# Patient Record
Sex: MALE | URBAN - METROPOLITAN AREA
[De-identification: names, ages, dates, MRNs, and addresses within clinical notes are randomized per-mention and may not be internally consistent; named-entity substitution may affect disease eponyms.]

---

## 2021-01-18 ENCOUNTER — HOSPITAL ENCOUNTER (INPATIENT)
Facility: MEDICAL CENTER | Age: 21
LOS: 2 days | DRG: 638 | End: 2021-01-20
Attending: INTERNAL MEDICINE | Admitting: INTERNAL MEDICINE
Payer: MEDICAID

## 2021-01-18 ENCOUNTER — APPOINTMENT (OUTPATIENT)
Dept: RADIOLOGY | Facility: MEDICAL CENTER | Age: 21
End: 2021-01-18
Payer: MEDICAID

## 2021-01-18 PROBLEM — E10.10 DIABETIC KETOACIDOSIS WITHOUT COMA ASSOCIATED WITH TYPE 1 DIABETES MELLITUS (HCC): Status: ACTIVE | Noted: 2021-01-18

## 2021-01-18 PROBLEM — F12.90 MARIJUANA USER: Status: ACTIVE | Noted: 2021-01-18

## 2021-01-18 PROBLEM — R65.10 SIRS (SYSTEMIC INFLAMMATORY RESPONSE SYNDROME) (HCC): Status: ACTIVE | Noted: 2021-01-18

## 2021-01-18 LAB
ALBUMIN SERPL BCP-MCNC: 4.3 G/DL (ref 3.2–4.9)
ALBUMIN/GLOB SERPL: 1.9 G/DL
ALP SERPL-CCNC: 78 U/L (ref 30–99)
ALT SERPL-CCNC: 9 U/L (ref 2–50)
ANION GAP SERPL CALC-SCNC: 17 MMOL/L (ref 7–16)
AST SERPL-CCNC: 10 U/L (ref 12–45)
B-OH-BUTYR SERPL-MCNC: 3.62 MMOL/L (ref 0.02–0.27)
BASOPHILS # BLD AUTO: 0.1 % (ref 0–1.8)
BASOPHILS # BLD: 0.03 K/UL (ref 0–0.12)
BILIRUB SERPL-MCNC: 0.6 MG/DL (ref 0.1–1.5)
BUN SERPL-MCNC: 12 MG/DL (ref 8–22)
CALCIUM SERPL-MCNC: 9.6 MG/DL (ref 8.5–10.5)
CHLORIDE SERPL-SCNC: 108 MMOL/L (ref 96–112)
CO2 SERPL-SCNC: 18 MMOL/L (ref 20–33)
CREAT SERPL-MCNC: 0.64 MG/DL (ref 0.5–1.4)
EOSINOPHIL # BLD AUTO: 0 K/UL (ref 0–0.51)
EOSINOPHIL NFR BLD: 0 % (ref 0–6.9)
ERYTHROCYTE [DISTWIDTH] IN BLOOD BY AUTOMATED COUNT: 41.4 FL (ref 35.9–50)
GLOBULIN SER CALC-MCNC: 2.3 G/DL (ref 1.9–3.5)
GLUCOSE BLD-MCNC: 183 MG/DL (ref 65–99)
GLUCOSE BLD-MCNC: 218 MG/DL (ref 65–99)
GLUCOSE BLD-MCNC: 227 MG/DL (ref 65–99)
GLUCOSE BLD-MCNC: 234 MG/DL (ref 65–99)
GLUCOSE BLD-MCNC: 240 MG/DL (ref 65–99)
GLUCOSE BLD-MCNC: 247 MG/DL (ref 65–99)
GLUCOSE BLD-MCNC: 260 MG/DL (ref 65–99)
GLUCOSE SERPL-MCNC: 263 MG/DL (ref 65–99)
HCT VFR BLD AUTO: 42.5 % (ref 42–52)
HGB BLD-MCNC: 14.5 G/DL (ref 14–18)
IMM GRANULOCYTES # BLD AUTO: 0.13 K/UL (ref 0–0.11)
IMM GRANULOCYTES NFR BLD AUTO: 0.6 % (ref 0–0.9)
LYMPHOCYTES # BLD AUTO: 1.87 K/UL (ref 1–4.8)
LYMPHOCYTES NFR BLD: 8.8 % (ref 22–41)
MAGNESIUM SERPL-MCNC: 1.6 MG/DL (ref 1.5–2.5)
MCH RBC QN AUTO: 29.2 PG (ref 27–33)
MCHC RBC AUTO-ENTMCNC: 34.1 G/DL (ref 33.7–35.3)
MCV RBC AUTO: 85.7 FL (ref 81.4–97.8)
MONOCYTES # BLD AUTO: 1.86 K/UL (ref 0–0.85)
MONOCYTES NFR BLD AUTO: 8.8 % (ref 0–13.4)
NEUTROPHILS # BLD AUTO: 17.24 K/UL (ref 1.82–7.42)
NEUTROPHILS NFR BLD: 81.7 % (ref 44–72)
NRBC # BLD AUTO: 0 K/UL
NRBC BLD-RTO: 0 /100 WBC
PHOSPHATE SERPL-MCNC: 1.7 MG/DL (ref 2.5–4.5)
PLATELET # BLD AUTO: 228 K/UL (ref 164–446)
PMV BLD AUTO: 11.5 FL (ref 9–12.9)
POTASSIUM SERPL-SCNC: 4 MMOL/L (ref 3.6–5.5)
PROT SERPL-MCNC: 6.6 G/DL (ref 6–8.2)
RBC # BLD AUTO: 4.96 M/UL (ref 4.7–6.1)
SODIUM SERPL-SCNC: 143 MMOL/L (ref 135–145)
WBC # BLD AUTO: 21.1 K/UL (ref 4.8–10.8)

## 2021-01-18 PROCEDURE — 83735 ASSAY OF MAGNESIUM: CPT

## 2021-01-18 PROCEDURE — 700111 HCHG RX REV CODE 636 W/ 250 OVERRIDE (IP): Performed by: HOSPITALIST

## 2021-01-18 PROCEDURE — 80053 COMPREHEN METABOLIC PANEL: CPT

## 2021-01-18 PROCEDURE — 99291 CRITICAL CARE FIRST HOUR: CPT | Performed by: INTERNAL MEDICINE

## 2021-01-18 PROCEDURE — 0240U HCHG SARS-COV-2 COVID-19 NFCT DS RESP RNA 3 TRGT MIC: CPT

## 2021-01-18 PROCEDURE — 700102 HCHG RX REV CODE 250 W/ 637 OVERRIDE(OP): Performed by: HOSPITALIST

## 2021-01-18 PROCEDURE — 82010 KETONE BODYS QUAN: CPT

## 2021-01-18 PROCEDURE — 85025 COMPLETE CBC W/AUTO DIFF WBC: CPT

## 2021-01-18 PROCEDURE — 82962 GLUCOSE BLOOD TEST: CPT

## 2021-01-18 PROCEDURE — 84100 ASSAY OF PHOSPHORUS: CPT

## 2021-01-18 PROCEDURE — 700105 HCHG RX REV CODE 258: Performed by: HOSPITALIST

## 2021-01-18 PROCEDURE — 770022 HCHG ROOM/CARE - ICU (200)

## 2021-01-18 PROCEDURE — 700111 HCHG RX REV CODE 636 W/ 250 OVERRIDE (IP): Performed by: INTERNAL MEDICINE

## 2021-01-18 PROCEDURE — 99223 1ST HOSP IP/OBS HIGH 75: CPT | Performed by: HOSPITALIST

## 2021-01-18 RX ORDER — ONDANSETRON 2 MG/ML
4 INJECTION INTRAMUSCULAR; INTRAVENOUS EVERY 4 HOURS PRN
Status: DISCONTINUED | OUTPATIENT
Start: 2021-01-18 | End: 2021-01-20 | Stop reason: HOSPADM

## 2021-01-18 RX ORDER — ONDANSETRON 4 MG/1
4 TABLET, ORALLY DISINTEGRATING ORAL EVERY 4 HOURS PRN
Status: DISCONTINUED | OUTPATIENT
Start: 2021-01-18 | End: 2021-01-20 | Stop reason: HOSPADM

## 2021-01-18 RX ORDER — DEXTROSE AND SODIUM CHLORIDE 10; .45 G/100ML; G/100ML
INJECTION, SOLUTION INTRAVENOUS CONTINUOUS
Status: ACTIVE | OUTPATIENT
Start: 2021-01-18 | End: 2021-01-19

## 2021-01-18 RX ORDER — POLYETHYLENE GLYCOL 3350 17 G/17G
1 POWDER, FOR SOLUTION ORAL
Status: DISCONTINUED | OUTPATIENT
Start: 2021-01-18 | End: 2021-01-20 | Stop reason: HOSPADM

## 2021-01-18 RX ORDER — POTASSIUM CHLORIDE 7.45 MG/ML
10 INJECTION INTRAVENOUS ONCE
Status: COMPLETED | OUTPATIENT
Start: 2021-01-18 | End: 2021-01-18

## 2021-01-18 RX ORDER — ONDANSETRON 2 MG/ML
4 INJECTION INTRAMUSCULAR; INTRAVENOUS EVERY 4 HOURS PRN
Status: ACTIVE | OUTPATIENT
Start: 2021-01-18 | End: 2021-01-18

## 2021-01-18 RX ORDER — DEXTROSE, SODIUM CHLORIDE, SODIUM LACTATE, POTASSIUM CHLORIDE, AND CALCIUM CHLORIDE 5; .6; .31; .03; .02 G/100ML; G/100ML; G/100ML; G/100ML; G/100ML
INJECTION, SOLUTION INTRAVENOUS CONTINUOUS
Status: DISCONTINUED | OUTPATIENT
Start: 2021-01-18 | End: 2021-01-19

## 2021-01-18 RX ORDER — BISACODYL 10 MG
10 SUPPOSITORY, RECTAL RECTAL
Status: DISCONTINUED | OUTPATIENT
Start: 2021-01-18 | End: 2021-01-20 | Stop reason: HOSPADM

## 2021-01-18 RX ORDER — PROMETHAZINE HYDROCHLORIDE 25 MG/1
12.5-25 SUPPOSITORY RECTAL EVERY 4 HOURS PRN
Status: DISCONTINUED | OUTPATIENT
Start: 2021-01-18 | End: 2021-01-20 | Stop reason: HOSPADM

## 2021-01-18 RX ORDER — ACETAMINOPHEN 325 MG/1
650 TABLET ORAL EVERY 6 HOURS PRN
Status: DISCONTINUED | OUTPATIENT
Start: 2021-01-18 | End: 2021-01-20 | Stop reason: HOSPADM

## 2021-01-18 RX ORDER — MAGNESIUM SULFATE HEPTAHYDRATE 40 MG/ML
4 INJECTION, SOLUTION INTRAVENOUS
Status: COMPLETED | OUTPATIENT
Start: 2021-01-18 | End: 2021-01-19

## 2021-01-18 RX ORDER — MAGNESIUM SULFATE HEPTAHYDRATE 40 MG/ML
2 INJECTION, SOLUTION INTRAVENOUS
Status: COMPLETED | OUTPATIENT
Start: 2021-01-18 | End: 2021-01-19

## 2021-01-18 RX ORDER — PROMETHAZINE HYDROCHLORIDE 25 MG/1
12.5-25 TABLET ORAL EVERY 4 HOURS PRN
Status: DISCONTINUED | OUTPATIENT
Start: 2021-01-18 | End: 2021-01-20 | Stop reason: HOSPADM

## 2021-01-18 RX ORDER — SODIUM CHLORIDE, SODIUM LACTATE, POTASSIUM CHLORIDE, AND CALCIUM CHLORIDE .6; .31; .03; .02 G/100ML; G/100ML; G/100ML; G/100ML
2000 INJECTION, SOLUTION INTRAVENOUS ONCE
Status: DISCONTINUED | OUTPATIENT
Start: 2021-01-18 | End: 2021-01-18

## 2021-01-18 RX ORDER — SODIUM CHLORIDE, SODIUM LACTATE, POTASSIUM CHLORIDE, CALCIUM CHLORIDE 600; 310; 30; 20 MG/100ML; MG/100ML; MG/100ML; MG/100ML
INJECTION, SOLUTION INTRAVENOUS CONTINUOUS
Status: DISCONTINUED | OUTPATIENT
Start: 2021-01-18 | End: 2021-01-19

## 2021-01-18 RX ORDER — AMOXICILLIN 250 MG
2 CAPSULE ORAL 2 TIMES DAILY
Status: DISCONTINUED | OUTPATIENT
Start: 2021-01-18 | End: 2021-01-20 | Stop reason: HOSPADM

## 2021-01-18 RX ORDER — PROCHLORPERAZINE EDISYLATE 5 MG/ML
5-10 INJECTION INTRAMUSCULAR; INTRAVENOUS EVERY 4 HOURS PRN
Status: DISCONTINUED | OUTPATIENT
Start: 2021-01-18 | End: 2021-01-20 | Stop reason: HOSPADM

## 2021-01-18 RX ORDER — SODIUM CHLORIDE 9 MG/ML
2000 INJECTION, SOLUTION INTRAVENOUS ONCE
Status: DISCONTINUED | OUTPATIENT
Start: 2021-01-18 | End: 2021-01-18

## 2021-01-18 RX ADMIN — POTASSIUM CHLORIDE 10 MEQ: 7.46 INJECTION, SOLUTION INTRAVENOUS at 22:31

## 2021-01-18 RX ADMIN — SODIUM CHLORIDE, SODIUM LACTATE, POTASSIUM CHLORIDE, CALCIUM CHLORIDE AND DEXTROSE MONOHYDRATE: 5; 600; 310; 30; 20 INJECTION, SOLUTION INTRAVENOUS at 17:26

## 2021-01-18 RX ADMIN — PROCHLORPERAZINE EDISYLATE 10 MG: 5 INJECTION INTRAMUSCULAR; INTRAVENOUS at 23:07

## 2021-01-18 RX ADMIN — SODIUM CHLORIDE 2 UNITS/HR: 9 INJECTION, SOLUTION INTRAVENOUS at 17:23

## 2021-01-18 RX ADMIN — SODIUM CHLORIDE, POTASSIUM CHLORIDE, SODIUM LACTATE AND CALCIUM CHLORIDE: 600; 310; 30; 20 INJECTION, SOLUTION INTRAVENOUS at 18:57

## 2021-01-18 RX ADMIN — MAGNESIUM SULFATE 2 G: 2 INJECTION INTRAVENOUS at 22:06

## 2021-01-18 RX ADMIN — ONDANSETRON 4 MG: 2 INJECTION INTRAMUSCULAR; INTRAVENOUS at 19:46

## 2021-01-18 SDOH — ECONOMIC STABILITY: FOOD INSECURITY: WITHIN THE PAST 12 MONTHS, THE FOOD YOU BOUGHT JUST DIDN'T LAST AND YOU DIDN'T HAVE MONEY TO GET MORE.: PATIENT DECLINED

## 2021-01-18 SDOH — HEALTH STABILITY: MENTAL HEALTH: HOW OFTEN DO YOU HAVE A DRINK CONTAINING ALCOHOL?: MONTHLY OR LESS

## 2021-01-18 SDOH — ECONOMIC STABILITY: TRANSPORTATION INSECURITY
IN THE PAST 12 MONTHS, HAS LACK OF TRANSPORTATION KEPT YOU FROM MEETINGS, WORK, OR FROM GETTING THINGS NEEDED FOR DAILY LIVING?: PATIENT DECLINED

## 2021-01-18 SDOH — ECONOMIC STABILITY: TRANSPORTATION INSECURITY
IN THE PAST 12 MONTHS, HAS THE LACK OF TRANSPORTATION KEPT YOU FROM MEDICAL APPOINTMENTS OR FROM GETTING MEDICATIONS?: PATIENT DECLINED

## 2021-01-18 SDOH — ECONOMIC STABILITY: FOOD INSECURITY: WITHIN THE PAST 12 MONTHS, YOU WORRIED THAT YOUR FOOD WOULD RUN OUT BEFORE YOU GOT MONEY TO BUY MORE.: PATIENT DECLINED

## 2021-01-18 ASSESSMENT — ENCOUNTER SYMPTOMS
SORE THROAT: 0
PALPITATIONS: 0
SPUTUM PRODUCTION: 0
HEADACHES: 0
COUGH: 0
FEVER: 0
ABDOMINAL PAIN: 1
HEMOPTYSIS: 0
VOMITING: 1
MYALGIAS: 0
CHILLS: 0
NAUSEA: 1
SHORTNESS OF BREATH: 0
DIARRHEA: 1
BLURRED VISION: 0

## 2021-01-18 ASSESSMENT — PAIN DESCRIPTION - PAIN TYPE: TYPE: ACUTE PAIN

## 2021-01-18 NOTE — CONSULTS
[facility] INTENSIVIST TRIAGE OFFICER DIRECT ADMISSION REPORT    Transferring facility: [facility] (Critical Access)  Transferring physician: Dr Trinidad, ED  Transferring facility/physician contact number: [place]    Chief complaint: DKA  Pertinent history & patient course: 22yo male with hx of DM, presented with N/V, BG 600s, diagnosed with DKA. Insulin gtt started  Pertinent imaging & lab results: -600s, pH 7.27, HCO3 13, AG 35, WBC 26K. Initially had tachycardia but resolved.     Code Status: not obtained.    Further work up or recommendations per triage officer prior to transfer: check COVID. They only have SARS CoV-2 Antigen rapid test, not PCR. I asked to go ahead and check for antigen.    Consultants called prior to transfer and pertinent input from consultants: Critical care  Patient accepted for transfer: Yes  Consultants to be called upon arrival: Triage Officer  Admission status: Inpatient.   Floor requested: ICU - will be COVID rule out. Need to swab for SARS CoV-2 PCR when pt arrived  If ICU transfer, name of intensivist case discussed with and pertinent input from critical care: Florin Carnes, DO    Please inform the triage officer upon arrival of the patient to [facility] for assignment of a hospitalist to perform admission.     For any question or concerns regarding the care of this patient, please reach out to the assigned hospitalist.

## 2021-01-19 PROBLEM — R11.2 NON-INTRACTABLE VOMITING WITH NAUSEA: Status: ACTIVE | Noted: 2021-01-19

## 2021-01-19 LAB
ANION GAP SERPL CALC-SCNC: 8 MMOL/L (ref 7–16)
ANION GAP SERPL CALC-SCNC: 9 MMOL/L (ref 7–16)
ANION GAP SERPL CALC-SCNC: 9 MMOL/L (ref 7–16)
BUN SERPL-MCNC: 10 MG/DL (ref 8–22)
BUN SERPL-MCNC: 5 MG/DL (ref 8–22)
BUN SERPL-MCNC: 6 MG/DL (ref 8–22)
CALCIUM SERPL-MCNC: 9.2 MG/DL (ref 8.5–10.5)
CALCIUM SERPL-MCNC: 9.3 MG/DL (ref 8.5–10.5)
CALCIUM SERPL-MCNC: 9.3 MG/DL (ref 8.5–10.5)
CHLORIDE SERPL-SCNC: 103 MMOL/L (ref 96–112)
CHLORIDE SERPL-SCNC: 104 MMOL/L (ref 96–112)
CHLORIDE SERPL-SCNC: 107 MMOL/L (ref 96–112)
CO2 SERPL-SCNC: 24 MMOL/L (ref 20–33)
CO2 SERPL-SCNC: 25 MMOL/L (ref 20–33)
CO2 SERPL-SCNC: 27 MMOL/L (ref 20–33)
CREAT SERPL-MCNC: 0.53 MG/DL (ref 0.5–1.4)
CREAT SERPL-MCNC: 0.54 MG/DL (ref 0.5–1.4)
CREAT SERPL-MCNC: 0.57 MG/DL (ref 0.5–1.4)
FLUAV RNA SPEC QL NAA+PROBE: NEGATIVE
FLUBV RNA SPEC QL NAA+PROBE: NEGATIVE
GLUCOSE BLD-MCNC: 107 MG/DL (ref 65–99)
GLUCOSE BLD-MCNC: 114 MG/DL (ref 65–99)
GLUCOSE BLD-MCNC: 120 MG/DL (ref 65–99)
GLUCOSE BLD-MCNC: 121 MG/DL (ref 65–99)
GLUCOSE BLD-MCNC: 143 MG/DL (ref 65–99)
GLUCOSE BLD-MCNC: 155 MG/DL (ref 65–99)
GLUCOSE BLD-MCNC: 160 MG/DL (ref 65–99)
GLUCOSE BLD-MCNC: 163 MG/DL (ref 65–99)
GLUCOSE BLD-MCNC: 176 MG/DL (ref 65–99)
GLUCOSE BLD-MCNC: 183 MG/DL (ref 65–99)
GLUCOSE BLD-MCNC: 195 MG/DL (ref 65–99)
GLUCOSE BLD-MCNC: 67 MG/DL (ref 65–99)
GLUCOSE BLD-MCNC: 72 MG/DL (ref 65–99)
GLUCOSE SERPL-MCNC: 156 MG/DL (ref 65–99)
GLUCOSE SERPL-MCNC: 191 MG/DL (ref 65–99)
GLUCOSE SERPL-MCNC: 197 MG/DL (ref 65–99)
MAGNESIUM SERPL-MCNC: 1.9 MG/DL (ref 1.5–2.5)
PHOSPHATE SERPL-MCNC: 1.1 MG/DL (ref 2.5–4.5)
POTASSIUM SERPL-SCNC: 3.4 MMOL/L (ref 3.6–5.5)
POTASSIUM SERPL-SCNC: 3.8 MMOL/L (ref 3.6–5.5)
POTASSIUM SERPL-SCNC: 3.8 MMOL/L (ref 3.6–5.5)
SARS-COV-2 RNA RESP QL NAA+PROBE: NOTDETECTED
SODIUM SERPL-SCNC: 137 MMOL/L (ref 135–145)
SODIUM SERPL-SCNC: 139 MMOL/L (ref 135–145)
SODIUM SERPL-SCNC: 140 MMOL/L (ref 135–145)
SPECIMEN SOURCE: NORMAL

## 2021-01-19 PROCEDURE — 99233 SBSQ HOSP IP/OBS HIGH 50: CPT | Performed by: HOSPITALIST

## 2021-01-19 PROCEDURE — 700102 HCHG RX REV CODE 250 W/ 637 OVERRIDE(OP): Performed by: HOSPITALIST

## 2021-01-19 PROCEDURE — 82962 GLUCOSE BLOOD TEST: CPT | Mod: 91

## 2021-01-19 PROCEDURE — 700102 HCHG RX REV CODE 250 W/ 637 OVERRIDE(OP): Performed by: INTERNAL MEDICINE

## 2021-01-19 PROCEDURE — 80048 BASIC METABOLIC PNL TOTAL CA: CPT

## 2021-01-19 PROCEDURE — A9270 NON-COVERED ITEM OR SERVICE: HCPCS | Performed by: INTERNAL MEDICINE

## 2021-01-19 PROCEDURE — 700111 HCHG RX REV CODE 636 W/ 250 OVERRIDE (IP): Performed by: INTERNAL MEDICINE

## 2021-01-19 PROCEDURE — 700101 HCHG RX REV CODE 250: Performed by: HOSPITALIST

## 2021-01-19 PROCEDURE — 700111 HCHG RX REV CODE 636 W/ 250 OVERRIDE (IP): Performed by: HOSPITALIST

## 2021-01-19 PROCEDURE — 83735 ASSAY OF MAGNESIUM: CPT

## 2021-01-19 PROCEDURE — A9270 NON-COVERED ITEM OR SERVICE: HCPCS | Performed by: HOSPITALIST

## 2021-01-19 PROCEDURE — 84100 ASSAY OF PHOSPHORUS: CPT

## 2021-01-19 PROCEDURE — 700105 HCHG RX REV CODE 258: Performed by: HOSPITALIST

## 2021-01-19 PROCEDURE — 770022 HCHG ROOM/CARE - ICU (200)

## 2021-01-19 RX ORDER — DEXTROSE, SODIUM CHLORIDE, SODIUM LACTATE, POTASSIUM CHLORIDE, AND CALCIUM CHLORIDE 5; .6; .31; .03; .02 G/100ML; G/100ML; G/100ML; G/100ML; G/100ML
INJECTION, SOLUTION INTRAVENOUS CONTINUOUS
Status: DISCONTINUED | OUTPATIENT
Start: 2021-01-19 | End: 2021-01-20

## 2021-01-19 RX ORDER — INSULIN GLARGINE 100 [IU]/ML
15 INJECTION, SOLUTION SUBCUTANEOUS ONCE
Status: ACTIVE | OUTPATIENT
Start: 2021-01-19 | End: 2021-01-20

## 2021-01-19 RX ORDER — DEXTROSE MONOHYDRATE 25 G/50ML
50 INJECTION, SOLUTION INTRAVENOUS
Status: DISCONTINUED | OUTPATIENT
Start: 2021-01-19 | End: 2021-01-20 | Stop reason: HOSPADM

## 2021-01-19 RX ORDER — INSULIN GLARGINE 100 [IU]/ML
20 INJECTION, SOLUTION SUBCUTANEOUS
Status: DISCONTINUED | OUTPATIENT
Start: 2021-01-19 | End: 2021-01-19

## 2021-01-19 RX ORDER — POTASSIUM CHLORIDE 7.45 MG/ML
10 INJECTION INTRAVENOUS
Status: COMPLETED | OUTPATIENT
Start: 2021-01-19 | End: 2021-01-19

## 2021-01-19 RX ORDER — HALOPERIDOL 5 MG/ML
2-5 INJECTION INTRAMUSCULAR EVERY 4 HOURS PRN
Status: DISCONTINUED | OUTPATIENT
Start: 2021-01-19 | End: 2021-01-19

## 2021-01-19 RX ORDER — METOCLOPRAMIDE HYDROCHLORIDE 5 MG/ML
10 INJECTION INTRAMUSCULAR; INTRAVENOUS EVERY 6 HOURS
Status: DISCONTINUED | OUTPATIENT
Start: 2021-01-19 | End: 2021-01-20

## 2021-01-19 RX ORDER — PROCHLORPERAZINE EDISYLATE 5 MG/ML
INJECTION INTRAMUSCULAR; INTRAVENOUS
Status: ACTIVE
Start: 2021-01-19 | End: 2021-01-20

## 2021-01-19 RX ORDER — INSULIN GLARGINE 100 [IU]/ML
35 INJECTION, SOLUTION SUBCUTANEOUS
Status: DISCONTINUED | OUTPATIENT
Start: 2021-01-20 | End: 2021-01-20 | Stop reason: HOSPADM

## 2021-01-19 RX ORDER — HALOPERIDOL 5 MG/1
5 TABLET ORAL EVERY 6 HOURS PRN
Status: DISCONTINUED | OUTPATIENT
Start: 2021-01-19 | End: 2021-01-20 | Stop reason: HOSPADM

## 2021-01-19 RX ORDER — FAMOTIDINE 20 MG/1
40 TABLET, FILM COATED ORAL EVERY 12 HOURS
Status: DISCONTINUED | OUTPATIENT
Start: 2021-01-19 | End: 2021-01-20 | Stop reason: HOSPADM

## 2021-01-19 RX ADMIN — POTASSIUM PHOSPHATE, MONOBASIC AND POTASSIUM PHOSPHATE, DIBASIC 30 MMOL: 224; 236 INJECTION, SOLUTION, CONCENTRATE INTRAVENOUS at 06:23

## 2021-01-19 RX ADMIN — ONDANSETRON 4 MG: 2 INJECTION INTRAMUSCULAR; INTRAVENOUS at 04:30

## 2021-01-19 RX ADMIN — POTASSIUM CHLORIDE 10 MEQ: 7.46 INJECTION, SOLUTION INTRAVENOUS at 06:23

## 2021-01-19 RX ADMIN — ACETAMINOPHEN 650 MG: 325 TABLET, FILM COATED ORAL at 10:57

## 2021-01-19 RX ADMIN — PROMETHAZINE HYDROCHLORIDE 25 MG: 25 TABLET ORAL at 19:59

## 2021-01-19 RX ADMIN — INSULIN HUMAN 3 UNITS: 100 INJECTION, SOLUTION PARENTERAL at 20:03

## 2021-01-19 RX ADMIN — INSULIN HUMAN 3 UNITS: 100 INJECTION, SOLUTION PARENTERAL at 13:06

## 2021-01-19 RX ADMIN — ACETAMINOPHEN 650 MG: 325 TABLET, FILM COATED ORAL at 19:59

## 2021-01-19 RX ADMIN — POTASSIUM CHLORIDE 10 MEQ: 7.46 INJECTION, SOLUTION INTRAVENOUS at 04:20

## 2021-01-19 RX ADMIN — ENOXAPARIN SODIUM 40 MG: 40 INJECTION SUBCUTANEOUS at 06:24

## 2021-01-19 RX ADMIN — HALOPERIDOL 5 MG: 5 TABLET ORAL at 20:10

## 2021-01-19 RX ADMIN — Medication 1 LOZENGE: at 08:47

## 2021-01-19 RX ADMIN — POTASSIUM CHLORIDE 10 MEQ: 7.46 INJECTION, SOLUTION INTRAVENOUS at 03:26

## 2021-01-19 RX ADMIN — ONDANSETRON 4 MG: 2 INJECTION INTRAMUSCULAR; INTRAVENOUS at 08:25

## 2021-01-19 RX ADMIN — ONDANSETRON 4 MG: 2 INJECTION INTRAMUSCULAR; INTRAVENOUS at 15:06

## 2021-01-19 RX ADMIN — DEXTROSE AND SODIUM CHLORIDE: 10; .45 INJECTION, SOLUTION INTRAVENOUS at 01:32

## 2021-01-19 RX ADMIN — POTASSIUM CHLORIDE 10 MEQ: 7.46 INJECTION, SOLUTION INTRAVENOUS at 02:18

## 2021-01-19 RX ADMIN — INSULIN GLARGINE 20 UNITS: 100 INJECTION, SOLUTION SUBCUTANEOUS at 04:14

## 2021-01-19 RX ADMIN — INSULIN HUMAN 3 UNITS: 100 INJECTION, SOLUTION PARENTERAL at 17:46

## 2021-01-19 RX ADMIN — METOCLOPRAMIDE 10 MG: 5 INJECTION, SOLUTION INTRAMUSCULAR; INTRAVENOUS at 17:42

## 2021-01-19 RX ADMIN — METOCLOPRAMIDE 10 MG: 5 INJECTION, SOLUTION INTRAMUSCULAR; INTRAVENOUS at 23:55

## 2021-01-19 RX ADMIN — SODIUM CHLORIDE, SODIUM LACTATE, POTASSIUM CHLORIDE, CALCIUM CHLORIDE AND DEXTROSE MONOHYDRATE: 5; 600; 310; 30; 20 INJECTION, SOLUTION INTRAVENOUS at 08:48

## 2021-01-19 RX ADMIN — METOCLOPRAMIDE 10 MG: 5 INJECTION, SOLUTION INTRAMUSCULAR; INTRAVENOUS at 08:57

## 2021-01-19 RX ADMIN — FAMOTIDINE 40 MG: 20 TABLET ORAL at 17:42

## 2021-01-19 ASSESSMENT — ENCOUNTER SYMPTOMS
NERVOUS/ANXIOUS: 1
NAUSEA: 1
ABDOMINAL PAIN: 1
CARDIOVASCULAR NEGATIVE: 1
EYES NEGATIVE: 1
CONSTITUTIONAL NEGATIVE: 1
NEUROLOGICAL NEGATIVE: 1
RESPIRATORY NEGATIVE: 1
MUSCULOSKELETAL NEGATIVE: 1
VOMITING: 1

## 2021-01-19 ASSESSMENT — PAIN DESCRIPTION - PAIN TYPE
TYPE: ACUTE PAIN

## 2021-01-19 NOTE — PROGRESS NOTES
"Pt's fsbs is 67. Pt refused glucose tablets and dextrose. Pt requested orange juice. Oj given.   Pt informed that his breakfast was arriving soon. Pt stated that if he likes his breakfast he would eat it. If not, he will refuse it because he is a \"really really picky eater\". This RN educated this patient on the importance of blood sugar management with proper nutritional intake.   "

## 2021-01-19 NOTE — PROGRESS NOTES
This RN called lab to follow up on BMP results. Lab called back stating that the specimen was hemolyzed. RN recollected lab and resent to lab as a STAT order.

## 2021-01-19 NOTE — ASSESSMENT & PLAN NOTE
Question if related to his DKA, gastroparesis or hyperemesis related to marijuana product  He does complain of some reflux/esophageal pain  We will treat symptomatically, Haldol, Reglan

## 2021-01-19 NOTE — CONSULTS
Critical Care Consultation    Date of consult: 1/18/2021    Referring Physician  Javan Villatoro MD    Reason for Consultation  DKA    History of Presenting Illness  21 y.o. male with type I DM who presented 1/18/2021 [facility] in [facility] with vomiting and diarrhea x2 days.  He was found to be in DKA, serum bicarbonate 13, anion gap 32, glucose 456, normal lipase.  He is received 4 L of crystalloid and started on insulin infusion and transferred here for higher level of care.  Last DKA admission 2 years ago.  Glycohemoglobin was 11.6 and he admits to poor carb counting but is compliant with Lantus 35 units and Humalog sliding scale.  Mild leukocytosis but denies recent fevers or chills cough sputum production, loss of taste or smell.  COVID-19 screening PCR pending.    Code Status  Full Code    Review of Systems  Review of Systems   Constitutional: Negative for chills and fever.   HENT: Negative for sore throat.    Eyes: Negative for blurred vision.   Respiratory: Negative for cough, hemoptysis and sputum production.    Cardiovascular: Negative for chest pain and palpitations.   Gastrointestinal: Positive for abdominal pain and nausea.   Genitourinary: Negative for dysuria.   Musculoskeletal: Negative for myalgias.   Neurological: Negative for headaches.       Past Medical History   has a past medical history of Diabetes (HCC).    Surgical History   has no past surgical history on file.    Family History  family history is not on file.    Social History   Uses marijuana daily, denies cigarette use and rarely drinks alcohol. He lives with mother and stepfather and 1 brother     Medications  Home Medications    **Home medications have not yet been reviewed for this encounter**       Current Facility-Administered Medications   Medication Dose Route Frequency Provider Last Rate Last Admin   • ondansetron (ZOFRAN) syringe/vial injection 4 mg  4 mg Intravenous Q4HRS PRN Florin Carnes D.O.       • D10%-0.45% NaCl  infusion   Intravenous Continuous Javan Villatoro M.D.   Stopped at 01/18/21 1645   • MD ALERT-PHARMACY TO CONSULT FOR DKA MONITORING 1 Each  1 Each Other PRN Javan Villatoro M.D.       • magnesium sulfate IVPB premix 2 g  2 g Intravenous Once PRN Javan Villatoro M.D.        Or   • magnesium sulfate IVPB premix 4 g  4 g Intravenous Once PRN Javan Villatoro M.D.       • potassium phosphate 30 mmol in  mL ivpb  30 mmol Intravenous Once PRN Javan Villatoro M.D.        Or   • sodium phosphate 30 mmol in 1/2  mL ivpb  30 mmol Intravenous Once PRN Javan Villatoro M.D.       • Adult DKA potassium(K+) replacement scale  1 Each Intravenous Q4HRS Javan Villatoro M.D.       • senna-docusate (PERICOLACE or SENOKOT S) 8.6-50 MG per tablet 2 Tab  2 Tab Oral BID Javan Villatoro M.D.        And   • polyethylene glycol/lytes (MIRALAX) PACKET 1 Packet  1 Packet Oral QDAY PRN Javan Villatoro M.D.        And   • magnesium hydroxide (MILK OF MAGNESIA) suspension 30 mL  30 mL Oral QDAY PRN Javan Villatoro M.D.        And   • bisacodyl (DULCOLAX) suppository 10 mg  10 mg Rectal QDAY PRN Javan Villatoro M.D.       • lactated ringers infusion   Intravenous Continuous Javan Villatoro M.D.   Stopped at 01/18/21 1645   • D5LR infusion   Intravenous Continuous Javan Villatoro M.D. 150 mL/hr at 01/18/21 1726 New Bag at 01/18/21 1726   • [START ON 1/19/2021] enoxaparin (LOVENOX) inj 40 mg  40 mg Subcutaneous DAILY Javan Villatoro M.D.       • acetaminophen (Tylenol) tablet 650 mg  650 mg Oral Q6HRS PRN Javan Villatoro M.D.       • ondansetron (ZOFRAN) syringe/vial injection 4 mg  4 mg Intravenous Q4HRS PRN Javan Villatoro M.D.       • ondansetron (ZOFRAN ODT) dispertab 4 mg  4 mg Oral Q4HRS PRN Javan Villatoro M.D.       • promethazine (PHENERGAN) tablet 12.5-25 mg  12.5-25 mg Oral Q4HRS PRN Javan Villatoro M.D.       • promethazine (PHENERGAN) suppository 12.5-25 mg  12.5-25 mg Rectal Q4HRS PRN Javan Villatoro M.D.       • prochlorperazine (COMPAZINE)  injection 5-10 mg  5-10 mg Intravenous Q4HRS PRN Javan Villatoro M.D.       • insulin regular human (HUMULIN/NOVOLIN R) 62.5 Units in  mL Infusion for DKA  2 Units/hr Intravenous Continuous Javan Villatoro M.D. 8 mL/hr at 01/18/21 1723 2 Units/hr at 01/18/21 1723       Allergies  Not on File    Vital Signs last 24 hours  Temp:  [37.2 °C (99 °F)] 37.2 °C (99 °F)  Pulse:  [109] 109  Resp:  [12] 12  BP: (121)/(103) 121/103    Physical Exam  Physical Exam  Constitutional:       General: He is not in acute distress.     Appearance: Normal appearance. He is ill-appearing.   HENT:      Head: Normocephalic and atraumatic.      Nose: Nose normal.      Mouth/Throat:      Pharynx: Oropharynx is clear.   Eyes:      Pupils: Pupils are equal, round, and reactive to light.   Neck:      Musculoskeletal: Neck supple.   Cardiovascular:      Rate and Rhythm: Regular rhythm. Tachycardia present.      Pulses: Normal pulses.   Pulmonary:      Effort: Pulmonary effort is normal. No respiratory distress.      Breath sounds: Normal breath sounds. No wheezing.   Abdominal:      Palpations: Abdomen is soft.      Tenderness: There is abdominal tenderness. There is no guarding or rebound.   Musculoskeletal:         General: No swelling or deformity.   Skin:     General: Skin is warm and dry.      Capillary Refill: Capillary refill takes less than 2 seconds.   Neurological:      General: No focal deficit present.      Mental Status: He is alert and oriented to person, place, and time.      Cranial Nerves: No cranial nerve deficit.         Fluids  No intake or output data in the 24 hours ending 01/18/21 1732    Laboratory  Recent Results (from the past 48 hour(s))   CBC with Differential    Collection Time: 01/18/21  4:58 PM   Result Value Ref Range    Nucleated RBC 0.00 /100 WBC    NRBC (Absolute) 0.00 K/uL       Imaging  OUTSIDE IMAGES-DX CHEST   Final Result          Assessment/Plan  * Diabetic ketoacidosis without coma associated with  type 1 diabetes mellitus (HCC)- (present on admission)  Assessment & Plan  Unclear precipitating event, no clear evidence of infection, compliant with her medications.  Hemoglobin A1c 11.6 consistent with poor control  Bicarb 13, anion gap 32, glucose 456  Continue IV fluid, insulin infusion, DKA protocol  Repeat labs pending  Monitor in ICU, DM education for better control  Reviewed with hospitalist      Discussed patient condition and risk of morbidity and/or mortality with Hospitalist.    The patient remains critically ill.  Critical care time = 31 minutes in directly providing and coordinating critical care and extensive data review.  No time overlap and excludes procedures.

## 2021-01-19 NOTE — PROGRESS NOTES
Hospital Medicine Daily Progress Note    Date of Service  1/19/2021    Chief Complaint  21 y.o. male admitted 1/18/2021 with nausea, vomiting and diarrhea, the patient carries a diagnosis of type 1 diabetes, he has been unable to eat, no definitive cause is identified.  The patient was admitted with DKA, he uses daily marijuana products.  He denies a history of gastroparesis.    Hospital Course  Patient met with DKA, improved on insulin drip and protocol    Interval Problem Update  Patient seen and examined today. ICU Care  Care and plan discussed in IDT/Hot rounds.  Lines and assistive devices reviewed.    Patient tolerating treatment and therapies.  All Data, Medication data reviewed.  Case discussed with nursing as available.  Plan of Care reviewed with patient and notified of changes.  1/19 the patient transitioning off insulin drip, DKA protocol, but is poorly tolerating p.o. nutrition, question if the patient suffers from hyperemesis secondary to marijuana abuse  Consultants/Specialty  Intensivist    Code Status  Full Code    Disposition  TBD    Review of Systems  Review of Systems   Constitutional: Negative.    HENT: Negative.    Eyes: Negative.    Respiratory: Negative.    Cardiovascular: Negative.    Gastrointestinal: Positive for abdominal pain, nausea and vomiting.   Genitourinary: Negative.    Musculoskeletal: Negative.    Skin: Negative.    Neurological: Negative.    Endo/Heme/Allergies: Negative.    Psychiatric/Behavioral: The patient is nervous/anxious.    All other systems reviewed and are negative.       Physical Exam  Temp:  [37.2 °C (99 °F)-37.7 °C (99.9 °F)] 37.4 °C (99.4 °F)  Pulse:  [] 118  Resp:  [0-20] 19  BP: ()/() 97/71  SpO2:  [93 %-97 %] 97 %    Physical Exam  Vitals signs and nursing note reviewed.   Constitutional:       Appearance: He is well-developed.      Comments: Pt seen and examined.  Thin male     HENT:      Head: Normocephalic and atraumatic.   Eyes:       Pupils: Pupils are equal, round, and reactive to light.   Neck:      Musculoskeletal: Normal range of motion and neck supple.   Cardiovascular:      Rate and Rhythm: Tachycardia present.      Heart sounds: Normal heart sounds.   Pulmonary:      Effort: Pulmonary effort is normal.      Breath sounds: Normal breath sounds.   Abdominal:      General: Bowel sounds are normal.      Palpations: Abdomen is soft.      Tenderness: There is abdominal tenderness.   Genitourinary:     Penis: Normal.    Musculoskeletal: Normal range of motion.   Skin:     General: Skin is warm and dry.   Neurological:      Mental Status: He is alert and oriented to person, place, and time.   Psychiatric:         Behavior: Behavior normal.         Fluids    Intake/Output Summary (Last 24 hours) at 1/19/2021 1205  Last data filed at 1/19/2021 1000  Gross per 24 hour   Intake 3627.17 ml   Output 1200 ml   Net 2427.17 ml       Laboratory  Recent Labs     01/18/21  1850   WBC 21.1*   RBC 4.96   HEMOGLOBIN 14.5   HEMATOCRIT 42.5   MCV 85.7   MCH 29.2   MCHC 34.1   RDW 41.4   PLATELETCT 228   MPV 11.5     Recent Labs     01/18/21 2013 01/19/21  0124   SODIUM 143 140   POTASSIUM 4.0 3.4*   CHLORIDE 108 107   CO2 18* 24   GLUCOSE 263* 156*   BUN 12 10   CREATININE 0.64 0.57   CALCIUM 9.6 9.3                   Imaging  OUTSIDE IMAGES-DX CHEST   Final Result           Assessment/Plan  * Diabetic ketoacidosis without coma associated with type 1 diabetes mellitus (HCC)- (present on admission)  Assessment & Plan  Labs from [place] reveal glucose of 456, Anion gap of 32, and a bicarb of 13  That is post insulin drip, DKA protocol  HbA1c is 11.6 consistent with poor outpatient control.  He is on Lantus 35 units outpatient with Humalog sliding scale based on carb counting   Diabetes education consult placed.  Will support with IV fluids until patient has sufficient p.o. intake  Continued nausea vomiting, needs sufficient control for the mention of further  DKA      Non-intractable vomiting with nausea  Assessment & Plan  Question if related to his DKA, gastroparesis or hyperemesis related to marijuana product  He does complain of some reflux/esophageal pain  We will treat symptomatically, Vivian Arnold    SIRS (systemic inflammatory response syndrome) (HCC)- (present on admission)  Assessment & Plan  SIRS criteria identified on my evaluation include:  Leukocytosis, with WBC greater than 12,000: WBC was 26 at [place]  SIRS is non-infectious, the patient does not have sepsis  Refrain from antibiotics as there does not appear to be a bacterial source of infection    Marijuana user- (present on admission)  Assessment & Plan  He smokes marijuana multiple times per day  Question if he suffers from hyperemesis secondary to cannabis products  Haldol as needed    Plan  Attempt to transition off insulin drip,  His inability to take adequate p.o. nutrition is worrisome though.  Continue IV fluids for now  Nausea vomiting control  See orders  Medically complex high risk patient      VTE prophylaxis: Lovenox    I have performed a physical exam and reviewed and updated ROS and Plan today . In review of yesterday's note , there are no changes except as documented above.        Please note that this dictation was created using voice recognition software. I have made every reasonable attempt to correct obvious errors, but I expect that there are errors of grammar and possibly context that I did not discover before finalizing the note.    This patient was seen under COVID 19 pandemic disaster response conditions.  During a disaster, the provisions of care is subject to the Crisis Standard of Care

## 2021-01-19 NOTE — ASSESSMENT & PLAN NOTE
Unclear precipitating event, no clear evidence of infection, compliant with her medications.  Hemoglobin A1c 11.6 consistent with poor control  Bicarb 13, anion gap 32, glucose 456  Continue IV fluid, insulin infusion, DKA protocol  Repeat labs pending  Monitor in ICU, DM education for better control  Reviewed with hospitalist

## 2021-01-19 NOTE — ASSESSMENT & PLAN NOTE
Labs from [place] reveal glucose of 456, Anion gap of 32, and a bicarb of 13  That is post insulin drip, DKA protocol  HbA1c is 11.6 consistent with poor outpatient control.  He is on Lantus 35 units outpatient with Humalog sliding scale based on carb counting   Diabetes education consult placed.  Will support with IV fluids until patient has sufficient p.o. intake  Continued nausea vomiting, needs sufficient control for the mention of further DKA

## 2021-01-19 NOTE — H&P
Hospital Medicine History & Physical Note    Date of Service  1/18/2021    Primary Care Physician  No primary care provider on file.    Consultants  Critical care.     Code Status  Full Code    Chief Complaint  vomiting and diarrhea    History of Presenting Illness  21 y.o. male who presented 1/18/2021 with vomiting and diarrhea.  Mr. Nathan has a history of Type I diabetes mellitus since [age] year old that presented to the ER in [place] with vomiting and diarrhea for 2 days. He states that he felt warm but did not take his temperature. He states that he has been compliant with Lantus 35 units and Humalog sliding scale 3-4 times a day based on carbohydrate counting. When asked the specifics of his carb counting, Mr. Nathan was unable to elaborate on the details and admits that perhaps he has not been particularly compliant in this aspect.  Labs were checked there and his glucose was 456, bicarb of 13, and anion gap of 32. His lipase was negative. He was given a total of 4 liters of fluid prior to transfer here on an IV insulin drip.   He lives with his mother, step father, and brother and none of them are ill.  He denies exposure to persons known to have COVID. He denies fevers, chills, SOB, cough, body aches, and no changes in his taste nor smell. At [place] they had a negative COVID antigen test though a COVID PCR will be checked here.   Review of Systems  Review of Systems   Constitutional: Negative for chills and fever.   Respiratory: Negative for cough and shortness of breath.    Cardiovascular: Negative for chest pain.   Gastrointestinal: Positive for abdominal pain, diarrhea, nausea and vomiting.   Genitourinary: Negative for dysuria.   All other systems reviewed and are negative.      Past Medical History  Type I DM since 1 year old  He guesses that he has had about 10 admissions for DKA. His last admit was in [place] about 2 years ago    Surgical History  none     Family History  No family hx of DM      Social History   he rarely drinks, no smoking, he smokes MJ multiple times a day    Allergies  Not on File    Medications  None       Physical Exam  Temp:  [37.2 °C (99 °F)] 37.2 °C (99 °F)  Pulse:  [109] 109  Resp:  [12] 12  BP: (121)/(103) 121/103    Physical Exam  Vitals signs and nursing note reviewed.   Constitutional:       Appearance: He is ill-appearing. He is not toxic-appearing.      Comments: thin   HENT:      Mouth/Throat:      Mouth: Mucous membranes are dry.      Pharynx: Oropharynx is clear.   Eyes:      General: No scleral icterus.     Conjunctiva/sclera: Conjunctivae normal.   Neck:      Musculoskeletal: Normal range of motion and neck supple.   Cardiovascular:      Rate and Rhythm: Regular rhythm. Tachycardia present.      Heart sounds: No murmur.   Pulmonary:      Effort: Pulmonary effort is normal. No respiratory distress.      Breath sounds: Normal breath sounds.   Abdominal:      Comments: Mild diffuse tenderness without rebound   Neurological:      Mental Status: He is alert.         Laboratory:          No results for input(s): ALTSGPT, ASTSGOT, ALKPHOSPHAT, TBILIRUBIN, DBILIRUBIN, GAMMAGT, AMYLASE, LIPASE, ALB, PREALBUMIN, GLUCOSE in the last 72 hours.      No results for input(s): NTPROBNP in the last 72 hours.      No results for input(s): TROPONINT in the last 72 hours.    Imaging:  OUTSIDE IMAGES-DX CHEST   Final Result            Assessment/Plan:  I anticipate this patient will require at least two midnights for appropriate medical management, necessitating inpatient admission.    * Diabetic ketoacidosis without coma associated with type 1 diabetes mellitus (HCC)- (present on admission)  Assessment & Plan  Labs from [place] reveal glucose of 456, Anion gap of 32, and a bicarb of 13  He will be admitted to the ICU on an IV insulin drip, serial BMPs q4 hours, fingerstick glucose q1 hour  Replace potassium, magnesium, phosphorus  HbA1c is 11.6 consistent with poor outpatient  control.  He is on Lantus 35 units outpatient with Humalog sliding scale based on carb counting   Diabetes education consult placed.      SIRS (systemic inflammatory response syndrome) (HCC)- (present on admission)  Assessment & Plan  SIRS criteria identified on my evaluation include:  Leukocytosis, with WBC greater than 12,000: WBC was 26 at [place]  SIRS is non-infectious, the patient does not have sepsis  Refrain from antibiotics as there does not appear to be a bacterial source of infection    Marijuana user- (present on admission)  Assessment & Plan  He smokes marijuana multiple times per day

## 2021-01-19 NOTE — ASSESSMENT & PLAN NOTE
He smokes marijuana multiple times per day  Question if he suffers from hyperemesis secondary to cannabis products  Haldol as needed

## 2021-01-19 NOTE — PROGRESS NOTES
Pt is unable to hold anything in. He vomited orange juice. Zofran administered. Dr. Hrady made aware. New orders: D5LR and prn reglan.

## 2021-01-19 NOTE — ASSESSMENT & PLAN NOTE
SIRS criteria identified on my evaluation include:  Leukocytosis, with WBC greater than 12,000: WBC was 26 at [place]  SIRS is non-infectious, the patient does not have sepsis  Refrain from antibiotics as there does not appear to be a bacterial source of infection

## 2021-01-19 NOTE — PROGRESS NOTES
2 RN skin assessment completed with Monik MEJIA RN.   Patient's skin is intact. No rash or open wounds noted.

## 2021-01-20 VITALS
DIASTOLIC BLOOD PRESSURE: 87 MMHG | TEMPERATURE: 98.1 F | BODY MASS INDEX: 22.15 KG/M2 | OXYGEN SATURATION: 95 % | SYSTOLIC BLOOD PRESSURE: 134 MMHG | RESPIRATION RATE: 18 BRPM | HEART RATE: 87 BPM | HEIGHT: 63 IN | WEIGHT: 125 LBS

## 2021-01-20 PROBLEM — E10.10 DIABETIC KETOACIDOSIS WITHOUT COMA ASSOCIATED WITH TYPE 1 DIABETES MELLITUS (HCC): Status: RESOLVED | Noted: 2021-01-18 | Resolved: 2021-01-20

## 2021-01-20 PROBLEM — R11.2 CANNABINOID HYPEREMESIS SYNDROME: Status: ACTIVE | Noted: 2021-01-20

## 2021-01-20 PROBLEM — R65.10 SIRS (SYSTEMIC INFLAMMATORY RESPONSE SYNDROME) (HCC): Status: RESOLVED | Noted: 2021-01-18 | Resolved: 2021-01-20

## 2021-01-20 PROBLEM — F12.90 CANNABINOID HYPEREMESIS SYNDROME: Status: ACTIVE | Noted: 2021-01-20

## 2021-01-20 LAB
ALBUMIN SERPL BCP-MCNC: 3.9 G/DL (ref 3.2–4.9)
ALBUMIN/GLOB SERPL: 1.6 G/DL
ALP SERPL-CCNC: 88 U/L (ref 30–99)
ALT SERPL-CCNC: 14 U/L (ref 2–50)
ANION GAP SERPL CALC-SCNC: 10 MMOL/L (ref 7–16)
ANION GAP SERPL CALC-SCNC: 11 MMOL/L (ref 7–16)
ANION GAP SERPL CALC-SCNC: 9 MMOL/L (ref 7–16)
AST SERPL-CCNC: 16 U/L (ref 12–45)
BASOPHILS # BLD AUTO: 0.2 % (ref 0–1.8)
BASOPHILS # BLD: 0.02 K/UL (ref 0–0.12)
BILIRUB SERPL-MCNC: 1 MG/DL (ref 0.1–1.5)
BUN SERPL-MCNC: 4 MG/DL (ref 8–22)
CALCIUM SERPL-MCNC: 9.3 MG/DL (ref 8.5–10.5)
CALCIUM SERPL-MCNC: 9.5 MG/DL (ref 8.5–10.5)
CALCIUM SERPL-MCNC: 9.8 MG/DL (ref 8.5–10.5)
CHLORIDE SERPL-SCNC: 101 MMOL/L (ref 96–112)
CHLORIDE SERPL-SCNC: 101 MMOL/L (ref 96–112)
CHLORIDE SERPL-SCNC: 98 MMOL/L (ref 96–112)
CO2 SERPL-SCNC: 27 MMOL/L (ref 20–33)
CO2 SERPL-SCNC: 27 MMOL/L (ref 20–33)
CO2 SERPL-SCNC: 28 MMOL/L (ref 20–33)
CREAT SERPL-MCNC: 0.42 MG/DL (ref 0.5–1.4)
CREAT SERPL-MCNC: 0.44 MG/DL (ref 0.5–1.4)
CREAT SERPL-MCNC: 0.55 MG/DL (ref 0.5–1.4)
EOSINOPHIL # BLD AUTO: 0.02 K/UL (ref 0–0.51)
EOSINOPHIL NFR BLD: 0.2 % (ref 0–6.9)
ERYTHROCYTE [DISTWIDTH] IN BLOOD BY AUTOMATED COUNT: 42.1 FL (ref 35.9–50)
GLOBULIN SER CALC-MCNC: 2.4 G/DL (ref 1.9–3.5)
GLUCOSE BLD-MCNC: 151 MG/DL (ref 65–99)
GLUCOSE SERPL-MCNC: 141 MG/DL (ref 65–99)
GLUCOSE SERPL-MCNC: 60 MG/DL (ref 65–99)
GLUCOSE SERPL-MCNC: 97 MG/DL (ref 65–99)
HCT VFR BLD AUTO: 43.9 % (ref 42–52)
HGB BLD-MCNC: 14.8 G/DL (ref 14–18)
IMM GRANULOCYTES # BLD AUTO: 0.03 K/UL (ref 0–0.11)
IMM GRANULOCYTES NFR BLD AUTO: 0.3 % (ref 0–0.9)
LYMPHOCYTES # BLD AUTO: 1.85 K/UL (ref 1–4.8)
LYMPHOCYTES NFR BLD: 20.7 % (ref 22–41)
MAGNESIUM SERPL-MCNC: 1.5 MG/DL (ref 1.5–2.5)
MCH RBC QN AUTO: 29.2 PG (ref 27–33)
MCHC RBC AUTO-ENTMCNC: 33.7 G/DL (ref 33.7–35.3)
MCV RBC AUTO: 86.8 FL (ref 81.4–97.8)
MONOCYTES # BLD AUTO: 0.9 K/UL (ref 0–0.85)
MONOCYTES NFR BLD AUTO: 10.1 % (ref 0–13.4)
NEUTROPHILS # BLD AUTO: 6.13 K/UL (ref 1.82–7.42)
NEUTROPHILS NFR BLD: 68.5 % (ref 44–72)
NRBC # BLD AUTO: 0 K/UL
NRBC BLD-RTO: 0 /100 WBC
PHOSPHATE SERPL-MCNC: 2.2 MG/DL (ref 2.5–4.5)
PLATELET # BLD AUTO: 200 K/UL (ref 164–446)
PMV BLD AUTO: 11 FL (ref 9–12.9)
POTASSIUM SERPL-SCNC: 3.5 MMOL/L (ref 3.6–5.5)
POTASSIUM SERPL-SCNC: 3.5 MMOL/L (ref 3.6–5.5)
POTASSIUM SERPL-SCNC: 3.6 MMOL/L (ref 3.6–5.5)
PROT SERPL-MCNC: 6.3 G/DL (ref 6–8.2)
RBC # BLD AUTO: 5.06 M/UL (ref 4.7–6.1)
SODIUM SERPL-SCNC: 134 MMOL/L (ref 135–145)
SODIUM SERPL-SCNC: 139 MMOL/L (ref 135–145)
SODIUM SERPL-SCNC: 139 MMOL/L (ref 135–145)
WBC # BLD AUTO: 9 K/UL (ref 4.8–10.8)

## 2021-01-20 PROCEDURE — 85025 COMPLETE CBC W/AUTO DIFF WBC: CPT

## 2021-01-20 PROCEDURE — 80053 COMPREHEN METABOLIC PANEL: CPT

## 2021-01-20 PROCEDURE — 80048 BASIC METABOLIC PNL TOTAL CA: CPT

## 2021-01-20 PROCEDURE — 84100 ASSAY OF PHOSPHORUS: CPT

## 2021-01-20 PROCEDURE — 700111 HCHG RX REV CODE 636 W/ 250 OVERRIDE (IP): Performed by: HOSPITALIST

## 2021-01-20 PROCEDURE — 700102 HCHG RX REV CODE 250 W/ 637 OVERRIDE(OP): Performed by: INTERNAL MEDICINE

## 2021-01-20 PROCEDURE — A9270 NON-COVERED ITEM OR SERVICE: HCPCS | Performed by: HOSPITALIST

## 2021-01-20 PROCEDURE — 99239 HOSP IP/OBS DSCHRG MGMT >30: CPT | Performed by: HOSPITALIST

## 2021-01-20 PROCEDURE — 700102 HCHG RX REV CODE 250 W/ 637 OVERRIDE(OP): Performed by: HOSPITALIST

## 2021-01-20 PROCEDURE — 83735 ASSAY OF MAGNESIUM: CPT

## 2021-01-20 PROCEDURE — 82962 GLUCOSE BLOOD TEST: CPT

## 2021-01-20 RX ORDER — FAMOTIDINE 40 MG/1
20 TABLET, FILM COATED ORAL 2 TIMES DAILY
Qty: 28 TAB | Refills: 0 | Status: SHIPPED | OUTPATIENT
Start: 2021-01-20 | End: 2021-01-20

## 2021-01-20 RX ORDER — ONDANSETRON 4 MG/1
4 TABLET, ORALLY DISINTEGRATING ORAL EVERY 4 HOURS PRN
Qty: 10 TAB | Refills: 0 | Status: SHIPPED | OUTPATIENT
Start: 2021-01-20 | End: 2021-01-20

## 2021-01-20 RX ORDER — HALOPERIDOL 5 MG/1
5 TABLET ORAL EVERY 6 HOURS PRN
Qty: 20 TAB | Refills: 1 | Status: SHIPPED | OUTPATIENT
Start: 2021-01-20 | End: 2021-01-20

## 2021-01-20 RX ORDER — POTASSIUM CHLORIDE 20 MEQ/1
40 TABLET, EXTENDED RELEASE ORAL ONCE
Status: COMPLETED | OUTPATIENT
Start: 2021-01-20 | End: 2021-01-20

## 2021-01-20 RX ORDER — ONDANSETRON 4 MG/1
4 TABLET, ORALLY DISINTEGRATING ORAL EVERY 4 HOURS PRN
Qty: 10 TAB | Refills: 0 | Status: SHIPPED | OUTPATIENT
Start: 2021-01-20 | End: 2021-01-21

## 2021-01-20 RX ORDER — HALOPERIDOL 5 MG/1
5 TABLET ORAL EVERY 6 HOURS PRN
Qty: 20 TAB | Refills: 1 | Status: SHIPPED | OUTPATIENT
Start: 2021-01-20 | End: 2021-01-21

## 2021-01-20 RX ORDER — FAMOTIDINE 40 MG/1
20 TABLET, FILM COATED ORAL 2 TIMES DAILY
Qty: 28 TAB | Refills: 0 | Status: SHIPPED | OUTPATIENT
Start: 2021-01-20 | End: 2021-01-21

## 2021-01-20 RX ORDER — INSULIN GLARGINE 100 [IU]/ML
35 INJECTION, SOLUTION SUBCUTANEOUS EVERY EVENING
COMMUNITY
End: 2021-01-21 | Stop reason: SDUPTHER

## 2021-01-20 RX ADMIN — ENOXAPARIN SODIUM 40 MG: 40 INJECTION SUBCUTANEOUS at 05:29

## 2021-01-20 RX ADMIN — INSULIN HUMAN 3 UNITS: 100 INJECTION, SOLUTION PARENTERAL at 09:38

## 2021-01-20 RX ADMIN — FAMOTIDINE 40 MG: 20 TABLET ORAL at 05:30

## 2021-01-20 RX ADMIN — PROCHLORPERAZINE EDISYLATE 10 MG: 5 INJECTION INTRAMUSCULAR; INTRAVENOUS at 05:34

## 2021-01-20 RX ADMIN — INSULIN GLARGINE 35 UNITS: 100 INJECTION, SOLUTION SUBCUTANEOUS at 05:57

## 2021-01-20 RX ADMIN — POTASSIUM CHLORIDE 40 MEQ: 1500 TABLET, EXTENDED RELEASE ORAL at 13:42

## 2021-01-20 RX ADMIN — DIBASIC SODIUM PHOSPHATE, MONOBASIC POTASSIUM PHOSPHATE AND MONOBASIC SODIUM PHOSPHATE 250 MG: 852; 155; 130 TABLET ORAL at 09:38

## 2021-01-20 RX ADMIN — ONDANSETRON 4 MG: 2 INJECTION INTRAMUSCULAR; INTRAVENOUS at 05:33

## 2021-01-20 RX ADMIN — METOCLOPRAMIDE 10 MG: 5 INJECTION, SOLUTION INTRAMUSCULAR; INTRAVENOUS at 05:30

## 2021-01-20 RX ADMIN — DOCUSATE SODIUM 50 MG AND SENNOSIDES 8.6 MG 2 TABLET: 8.6; 5 TABLET, FILM COATED ORAL at 05:30

## 2021-01-20 ASSESSMENT — PAIN DESCRIPTION - PAIN TYPE: TYPE: ACUTE PAIN

## 2021-01-20 NOTE — DISCHARGE INSTRUCTIONS
Marijuana Abuse  Your exam shows you have used marijuana or pot. There are many health problems related to marijuana abuse. These include:  · Bronchitis.  · Chronic cough.  · Emphysema.  · Lung and upper airway cancer.  Abusers also experience impairment in:  · Memory.  · Judgment.  · Ability to learn.  · Coordination.  Students who smoke marijuana:  · Get lower grades.  · Are less likely to graduate than those who do not.  Adults who abuse marijuana:  · Have problems at work.  · May even lose their jobs due to:  · Poor work performance.  · Absenteeism.  Attention, memory, and learning skills have been shown to be diminished for up to 6 months after stopping regular use, and there is evidence that the effects can be cumulative over a lifetime.   Heavier use of marijuana also puts a strain on relationships with friends and loved ones and can lead to moodiness and loss of confidence. Acute intoxication can lead to:  · Increased anxiety.  · A panic episode.  It also increases the risk for having an automobile accident. This is especially true if the pot is combined with alcohol or other intoxicants. Treatment for acute intoxication is rarely needed. However, medicine to reduce anxiety may be helpful in some people.  Millions of people are considered to be dependent on marijuana. It is long-term regular use that leads to addiction and all of its complex problems. Information on the problem of addiction and the health problems of long-term abuse is posted at the National Central for Drug Abuse website, www.drugabuse.gov. Consult with your doctor or counselor if you want further information and support in handling this common problem.  Document Released: 01/25/2006 Document Revised: 03/11/2013 Document Reviewed: 11/11/2008  ExitCare® Patient Information ©2014 [employer], [facility].  Discharge Instructions    Discharged to home with mother, via car  Be sure to schedule a follow-up appointment with your primary care doctor or any  specialists as instructed.     Discharge Plan:        I understand that a diet low in cholesterol, fat, and sodium is recommended for good health. Unless I have been given specific instructions below for another diet, I accept this instruction as my diet prescription.   Other diet: diabetic diet    Special Instructions:  · Is patient discharged on Warfarin / Coumadin?   NO    Depression / Suicide Risk    As you are discharged from this [facility] facility, it is important to learn how to keep safe from harming yourself.    Recognize the warning signs:  · Abrupt changes in personality, positive or negative- including increase in energy   · Giving away possessions  · Change in eating patterns- significant weight changes-  positive or negative  · Change in sleeping patterns- unable to sleep or sleeping all the time   · Unwillingness or inability to communicate  · Depression  · Unusual sadness, discouragement and loneliness  · Talk of wanting to die  · Neglect of personal appearance   · Rebelliousness- reckless behavior  · Withdrawal from people/activities they love  · Confusion- inability to concentrate     If you or a loved one observes any of these behaviors or has concerns about self-harm, here's what you can do:  · Talk about it- your feelings and reasons for harming yourself  · Remove any means that you might use to hurt yourself (examples: pills, rope, extension cords, firearm)  · Get professional help from the community (Mental Health, Substance Abuse, psychological counseling)  · Do not be alone:Call your Safe Contact- someone whom you trust who will be there for you.  · Call your local CRISIS HOTLINE 678-1014 or 776-093-3560  · Call your local Children's Mobile Crisis Response Team [place] (050) 206-9221 or www.[employer]MART  · Call the toll free National Suicide Prevention Hotlines   · National Suicide Prevention Lifeline 673-285-MEFF (7768)  · National Hope Line Network 800-SUICIDE  (289-7106)    Physician Discharge Instructions:  Discharge Diagnosis: DKA, uncontrolled Diabetes, N/V  Proceed to discharge/transfer  to Home  Take Rx/Prescriptions as prescribed and reconciled per AVS  For OTC Medication consult with your Pharmacist first.  Diet: Strictly Diabetic, 5 small meals a day, bedtime snack  Activity: As tolerated  F/u with PCP within 3-10 days at home location, ask for f/u prescriptions by PCP  F/u with Specialty MD: Endocrinology if possible  For new, severe symptoms refer to the next Emergency Care or call your Physician.  Controlled Substance History was reviewed if new Rx was issued.    Brett Hardy MD

## 2021-01-20 NOTE — PROGRESS NOTES
0515 observed patient gagging and producing multiple small episodes of emesis into trash can next to bed. Checked patient's temperature and it was 97.3F temporal. Patient explained that his nausea/emesis was similar to the other episodes of nausea/emesis that had happended at the beginning of the my shift.  Patient reported that he is unable to keep anything in his stomach and he ate < 20% of his dinner that was bedside at the start of shift.  At, 0533 Administered IV ondansetron 4mg and subsequently prochlorperazine 10mg. By 0540 patient said he not longer felt nausea or the need to vomit. Administered PO morning medications without complications, emesis or gagging . Note read to the oncoming day shift nurse. See MAR cruz details.

## 2021-01-20 NOTE — DISCHARGE SUMMARY
Discharge Summary    CHIEF COMPLAINT ON ADMISSION  No chief complaint on file.      Reason for Admission  DKA, type 1 DM     Admission Date  1/18/2021    CODE STATUS  Full Code    HPI & HOSPITAL COURSE  This is a 21 y.o. male here with DKA admission, nausea vomiting diarrhea,  Patient likely suffering from cannabis induced hyperemesis, the patient was transitioned off DKA protocol successfully, he was reintroduced to his prior regimen, the patient was previously taken care of at [facility], he stated that he has overall fair control with blood sugars between 150 and 250, unfortunately he uses excessive amounts of cannabis products on a daily basis and developed in my mind cannabis related hyperemesis syndrome.  The patient was instructed to cut down on his use, he states that he is unable to quit entirely as he is using it to treat his tics, involuntary movement disorder.  The patient this time is provided prescription for Haldol, Zofran to treat his nausea on a as needed basis, he likely suffered a degree of esophageal irritation from nausea vomiting, the patient declined diabetic teaching, he is instructed to spread his meals to 5 small meals throughout the day and make sure he has a nighttime/bedtime snack.  Therefore, he is discharged in fair and stable condition to home with close outpatient follow-up.    The patient met 2-midnight criteria for an inpatient stay at the time of discharge.    Discharge Date  1/20/2021    FOLLOW UP ITEMS POST DISCHARGE  Close follow-up with PCP or endocrinology to further optimize his glycemic control    DISCHARGE DIAGNOSES  Principal Problem (Resolved):    Diabetic ketoacidosis without coma associated with type 1 diabetes mellitus (HCC) POA: Yes  Active Problems:    Non-intractable vomiting with nausea POA: Yes    Cannabinoid hyperemesis syndrome POA: Unknown    Marijuana user POA: Yes  Resolved Problems:    SIRS (systemic inflammatory response syndrome) (HCC) POA: Yes      FOLLOW  UP  PCP, endocrinology  MEDICATIONS ON DISCHARGE     Medication List      START taking these medications      Instructions   famotidine 40 MG Tabs  Commonly known as: PEPCID   Take 0.5 Tabs by mouth 2 times a day for 14 days.  Dose: 20 mg     haloperidol 5 MG Tabs  Commonly known as: HALDOL   Take 1 Tab by mouth every 6 hours as needed (N/V).  Dose: 5 mg     ondansetron 4 MG Tbdp  Commonly known as: ZOFRAN ODT   Take 1 Tab by mouth every four hours as needed for Nausea (give PO if no IV route available).  Dose: 4 mg        CHANGE how you take these medications      Instructions   insulin lispro 100 UNIT/ML  What changed:   · how much to take  · additional instructions  Commonly known as: HumaLOG   Inject 3-14 Units under the skin 3 times a day before meals. Per sliding scale  Dose: 3-14 Units        CONTINUE taking these medications      Instructions   Lantus 100 UNIT/ML Soln  Generic drug: insulin glargine   Inject 35 Units under the skin every evening.  Dose: 35 Units            Allergies  Not on File    DIET  Orders Placed This Encounter   Procedures   • Diet Order Diet: Consistent CHO (Diabetic)     Standing Status:   Standing     Number of Occurrences:   1     Order Specific Question:   Diet:     Answer:   Consistent CHO (Diabetic) [4]       ACTIVITY  As tolerated.  Weight bearing as tolerated    CONSULTATIONS  Intensivist    PROCEDURES  None    LABORATORY  Lab Results   Component Value Date    SODIUM 134 (L) 01/20/2021    POTASSIUM 3.5 (L) 01/20/2021    CHLORIDE 98 01/20/2021    CO2 27 01/20/2021    GLUCOSE 97 01/20/2021    BUN 4 (L) 01/20/2021    CREATININE 0.42 (L) 01/20/2021        Lab Results   Component Value Date    WBC 9.0 01/20/2021    HEMOGLOBIN 14.8 01/20/2021    HEMATOCRIT 43.9 01/20/2021    PLATELETCT 200 01/20/2021        Total time of the discharge process exceeds 55 minutes.

## 2021-01-21 RX ORDER — INSULIN GLARGINE 100 [IU]/ML
35 INJECTION, SOLUTION SUBCUTANEOUS EVERY EVENING
Qty: 10 ML | Refills: 3 | Status: SHIPPED | OUTPATIENT
Start: 2021-01-21

## 2021-01-21 RX ORDER — INSULIN GLARGINE 100 [IU]/ML
35 INJECTION, SOLUTION SUBCUTANEOUS EVERY EVENING
Qty: 10 ML | Refills: 3 | Status: SHIPPED | OUTPATIENT
Start: 2021-01-21 | End: 2021-01-21 | Stop reason: SDUPTHER

## 2021-01-21 RX ORDER — HALOPERIDOL 5 MG/1
5 TABLET ORAL EVERY 6 HOURS PRN
Qty: 20 TAB | Refills: 1 | Status: SHIPPED | OUTPATIENT
Start: 2021-01-21 | End: 2021-01-21

## 2021-01-21 RX ORDER — FAMOTIDINE 40 MG/1
20 TABLET, FILM COATED ORAL 2 TIMES DAILY
Qty: 14 TAB | Refills: 0 | Status: SHIPPED | OUTPATIENT
Start: 2021-01-21 | End: 2021-02-04

## 2021-01-21 RX ORDER — FAMOTIDINE 40 MG/1
20 TABLET, FILM COATED ORAL 2 TIMES DAILY
Qty: 28 TAB | Refills: 0 | Status: SHIPPED | OUTPATIENT
Start: 2021-01-21 | End: 2021-01-21

## 2021-01-21 RX ORDER — ONDANSETRON 4 MG/1
4 TABLET, ORALLY DISINTEGRATING ORAL EVERY 4 HOURS PRN
Qty: 10 TAB | Refills: 2 | Status: SHIPPED | OUTPATIENT
Start: 2021-01-21 | End: 2021-01-21

## 2021-01-21 RX ORDER — HALOPERIDOL 5 MG/1
5 TABLET ORAL EVERY 6 HOURS PRN
Qty: 20 TAB | Refills: 1 | Status: SHIPPED | OUTPATIENT
Start: 2021-01-21

## 2021-01-21 RX ORDER — ONDANSETRON 4 MG/1
4 TABLET, ORALLY DISINTEGRATING ORAL EVERY 4 HOURS PRN
Qty: 10 TAB | Refills: 2 | Status: SHIPPED | OUTPATIENT
Start: 2021-01-21

## 2021-01-25 ENCOUNTER — PATIENT OUTREACH (OUTPATIENT)
Dept: HEALTH INFORMATION MANAGEMENT | Facility: OTHER | Age: 21
End: 2021-01-25

## 2021-01-25 NOTE — PROGRESS NOTES
DIANE Briceno will not follow patient at this time per Addie MONTIEL as patient has patient was DC without demographics. Unable to contact patient.

## 2024-01-16 ENCOUNTER — HOSPITAL ENCOUNTER (EMERGENCY)
Facility: MEDICAL CENTER | Age: 24
End: 2024-01-16
Payer: MEDICAID

## 2025-01-21 ENCOUNTER — HOSPITAL ENCOUNTER (OUTPATIENT)
Dept: RADIOLOGY | Facility: MEDICAL CENTER | Age: 25
End: 2025-01-21

## 2025-01-21 ENCOUNTER — HOSPITAL ENCOUNTER (INPATIENT)
Facility: MEDICAL CENTER | Age: 25
LOS: 4 days | End: 2025-01-25
Attending: EMERGENCY MEDICINE | Admitting: HOSPITALIST
Payer: MEDICAID

## 2025-01-21 ENCOUNTER — APPOINTMENT (OUTPATIENT)
Dept: RADIOLOGY | Facility: MEDICAL CENTER | Age: 25
End: 2025-01-21
Attending: EMERGENCY MEDICINE
Payer: MEDICAID

## 2025-01-21 DIAGNOSIS — R11.2 NAUSEA AND VOMITING, UNSPECIFIED VOMITING TYPE: ICD-10-CM

## 2025-01-21 DIAGNOSIS — E87.20 ACIDOSIS: ICD-10-CM

## 2025-01-21 DIAGNOSIS — R19.7 DIARRHEA, UNSPECIFIED TYPE: ICD-10-CM

## 2025-01-21 DIAGNOSIS — K29.21 GASTROINTESTINAL HEMORRHAGE ASSOCIATED WITH ALCOHOLIC GASTRITIS: ICD-10-CM

## 2025-01-21 DIAGNOSIS — F19.10 POLYSUBSTANCE ABUSE (HCC): ICD-10-CM

## 2025-01-21 DIAGNOSIS — F10.939 ALCOHOL WITHDRAWAL SYNDROME WITH COMPLICATION (HCC): ICD-10-CM

## 2025-01-21 PROBLEM — E83.42 HYPOMAGNESEMIA: Status: ACTIVE | Noted: 2025-01-21

## 2025-01-21 PROBLEM — D72.829 LEUKOCYTOSIS: Status: ACTIVE | Noted: 2025-01-21

## 2025-01-21 PROBLEM — E10.9 DIABETES MELLITUS TYPE 1 (HCC): Status: ACTIVE | Noted: 2025-01-21

## 2025-01-21 PROBLEM — E83.39 HYPOPHOSPHATEMIA: Status: ACTIVE | Noted: 2025-01-21

## 2025-01-21 PROBLEM — K92.2 GI BLEED: Status: ACTIVE | Noted: 2025-01-21

## 2025-01-21 LAB
ABO + RH BLD: NORMAL
ABO GROUP BLD: NORMAL
ALBUMIN SERPL BCP-MCNC: 4.4 G/DL (ref 3.2–4.9)
ALBUMIN/GLOB SERPL: 1.4 G/DL
ALP SERPL-CCNC: 76 U/L (ref 30–99)
ALT SERPL-CCNC: 18 U/L (ref 2–50)
AMPHET UR QL SCN: NEGATIVE
ANION GAP SERPL CALC-SCNC: 22 MMOL/L (ref 7–16)
APPEARANCE UR: CLEAR
APTT PPP: 26.4 SEC (ref 24.7–36)
AST SERPL-CCNC: 23 U/L (ref 12–45)
B-OH-BUTYR SERPL-MCNC: >8 MMOL/L (ref 0.02–0.27)
BACTERIA #/AREA URNS HPF: NORMAL /HPF
BARBITURATES UR QL SCN: NEGATIVE
BASE EXCESS BLDV CALC-SCNC: -9 MMOL/L (ref -2–3)
BASOPHILS # BLD AUTO: 0.4 % (ref 0–1.8)
BASOPHILS # BLD: 0.06 K/UL (ref 0–0.12)
BENZODIAZ UR QL SCN: POSITIVE
BILIRUB SERPL-MCNC: 0.7 MG/DL (ref 0.1–1.5)
BILIRUB UR QL STRIP.AUTO: ABNORMAL
BLD GP AB SCN SERPL QL: NORMAL
BODY TEMPERATURE: 36.3 CENTIGRADE
BUN SERPL-MCNC: 7 MG/DL (ref 8–22)
BZE UR QL SCN: NEGATIVE
CALCIUM ALBUM COR SERPL-MCNC: 9.1 MG/DL (ref 8.5–10.5)
CALCIUM SERPL-MCNC: 9.4 MG/DL (ref 8.4–10.2)
CANNABINOIDS UR QL SCN: POSITIVE
CASTS URNS QL MICRO: NORMAL /LPF (ref 0–2)
CHLORIDE SERPL-SCNC: 102 MMOL/L (ref 96–112)
CO2 SERPL-SCNC: 17 MMOL/L (ref 20–33)
COLOR UR: YELLOW
CREAT SERPL-MCNC: 0.76 MG/DL (ref 0.5–1.4)
EKG IMPRESSION: NORMAL
EOSINOPHIL # BLD AUTO: 0.07 K/UL (ref 0–0.51)
EOSINOPHIL NFR BLD: 0.5 % (ref 0–6.9)
EPITHELIAL CELLS 1715: NORMAL /HPF (ref 0–5)
ERYTHROCYTE [DISTWIDTH] IN BLOOD BY AUTOMATED COUNT: 47.8 FL (ref 35.9–50)
ERYTHROCYTE [DISTWIDTH] IN BLOOD BY AUTOMATED COUNT: 49.1 FL (ref 35.9–50)
ETHANOL BLD-MCNC: <10.1 MG/DL
FENTANYL UR QL: NEGATIVE
FLUAV RNA SPEC QL NAA+PROBE: NEGATIVE
FLUBV RNA SPEC QL NAA+PROBE: NEGATIVE
GFR SERPLBLD CREATININE-BSD FMLA CKD-EPI: 126 ML/MIN/1.73 M 2
GLOBULIN SER CALC-MCNC: 3.1 G/DL (ref 1.9–3.5)
GLUCOSE BLD STRIP.AUTO-MCNC: 141 MG/DL (ref 65–99)
GLUCOSE SERPL-MCNC: 126 MG/DL (ref 65–99)
GLUCOSE UR STRIP.AUTO-MCNC: NEGATIVE MG/DL
HCO3 BLDV-SCNC: 16 MMOL/L (ref 22–29)
HCT VFR BLD AUTO: 48.4 % (ref 42–52)
HCT VFR BLD AUTO: 48.9 % (ref 42–52)
HGB BLD-MCNC: 16.5 G/DL (ref 14–18)
HGB BLD-MCNC: 16.7 G/DL (ref 14–18)
HYALINE CAST   1831: PRESENT /LPF
IMM GRANULOCYTES # BLD AUTO: 0.06 K/UL (ref 0–0.11)
IMM GRANULOCYTES NFR BLD AUTO: 0.4 % (ref 0–0.9)
INHALED O2 FLOW RATE: ABNORMAL L/MIN
INR PPP: 1.08 (ref 0.87–1.13)
KETONES UR STRIP.AUTO-MCNC: >=80 MG/DL
LACTATE SERPL-SCNC: 1 MMOL/L (ref 0.5–2)
LEUKOCYTE ESTERASE UR QL STRIP.AUTO: NEGATIVE
LIPASE SERPL-CCNC: 5 U/L (ref 11–82)
LYMPHOCYTES # BLD AUTO: 1.43 K/UL (ref 1–4.8)
LYMPHOCYTES NFR BLD: 10.2 % (ref 22–41)
MAGNESIUM SERPL-MCNC: 1.2 MG/DL (ref 1.5–2.5)
MCH RBC QN AUTO: 31.5 PG (ref 27–33)
MCH RBC QN AUTO: 31.6 PG (ref 27–33)
MCHC RBC AUTO-ENTMCNC: 33.7 G/DL (ref 32.3–36.5)
MCHC RBC AUTO-ENTMCNC: 34.5 G/DL (ref 32.3–36.5)
MCV RBC AUTO: 91.7 FL (ref 81.4–97.8)
MCV RBC AUTO: 93.3 FL (ref 81.4–97.8)
METHADONE UR QL SCN: NEGATIVE
MICRO URNS: ABNORMAL
MONOCYTES # BLD AUTO: 0.79 K/UL (ref 0–0.85)
MONOCYTES NFR BLD AUTO: 5.6 % (ref 0–13.4)
MUCOUS THREADS URNS QL MICRO: PRESENT /HPF
NEUTROPHILS # BLD AUTO: 11.65 K/UL (ref 1.82–7.42)
NEUTROPHILS NFR BLD: 82.9 % (ref 44–72)
NITRITE UR QL STRIP.AUTO: NEGATIVE
NRBC # BLD AUTO: 0 K/UL
NRBC BLD-RTO: 0 /100 WBC (ref 0–0.2)
OPIATES UR QL SCN: NEGATIVE
OXYCODONE UR QL SCN: NEGATIVE
PCO2 BLDV: 32.1 MMHG (ref 38–54)
PCO2 TEMP ADJ BLDV: 31.1 MMHG
PCP UR QL SCN: NEGATIVE
PH BLDV: 7.31 [PH] (ref 7.31–7.45)
PH TEMP ADJ BLDV: 7.32 [PH] (ref 7.31–7.45)
PH UR STRIP.AUTO: 6 [PH] (ref 5–8)
PHOSPHATE SERPL-MCNC: 1.9 MG/DL (ref 2.5–4.5)
PLATELET # BLD AUTO: 239 K/UL (ref 164–446)
PLATELET # BLD AUTO: 257 K/UL (ref 164–446)
PMV BLD AUTO: 11.2 FL (ref 9–12.9)
PMV BLD AUTO: 11.4 FL (ref 9–12.9)
PO2 BLDV: 50 MMHG (ref 23–48)
PO2 TEMP ADJ BLDV: 47.6 MMHG (ref 23–48)
POTASSIUM SERPL-SCNC: 3.9 MMOL/L (ref 3.6–5.5)
PROPOXYPH UR QL SCN: NEGATIVE
PROT SERPL-MCNC: 7.5 G/DL (ref 6–8.2)
PROT UR QL STRIP: NEGATIVE MG/DL
PROTHROMBIN TIME: 14.4 SEC (ref 12–14.6)
RBC # BLD AUTO: 5.24 M/UL (ref 4.7–6.1)
RBC # BLD AUTO: 5.28 M/UL (ref 4.7–6.1)
RBC # URNS HPF: NORMAL /HPF
RBC UR QL AUTO: ABNORMAL
RH BLD: NORMAL
RSV RNA SPEC QL NAA+PROBE: NEGATIVE
SAO2 % BLDV: 82.5 % (ref 60–85)
SARS-COV-2 RNA RESP QL NAA+PROBE: NOTDETECTED
SODIUM SERPL-SCNC: 141 MMOL/L (ref 135–145)
SP GR UR STRIP.AUTO: >=1.03
SPECIMEN SOURCE: NORMAL
WBC # BLD AUTO: 14.1 K/UL (ref 4.8–10.8)
WBC # BLD AUTO: 14.3 K/UL (ref 4.8–10.8)
WBC #/AREA URNS HPF: NORMAL /HPF

## 2025-01-21 PROCEDURE — 80307 DRUG TEST PRSMV CHEM ANLYZR: CPT

## 2025-01-21 PROCEDURE — 700105 HCHG RX REV CODE 258: Performed by: HOSPITALIST

## 2025-01-21 PROCEDURE — 770020 HCHG ROOM/CARE - TELE (206)

## 2025-01-21 PROCEDURE — 36415 COLL VENOUS BLD VENIPUNCTURE: CPT

## 2025-01-21 PROCEDURE — 83690 ASSAY OF LIPASE: CPT

## 2025-01-21 PROCEDURE — 700102 HCHG RX REV CODE 250 W/ 637 OVERRIDE(OP): Performed by: HOSPITALIST

## 2025-01-21 PROCEDURE — 84100 ASSAY OF PHOSPHORUS: CPT

## 2025-01-21 PROCEDURE — 83036 HEMOGLOBIN GLYCOSYLATED A1C: CPT

## 2025-01-21 PROCEDURE — 82077 ASSAY SPEC XCP UR&BREATH IA: CPT

## 2025-01-21 PROCEDURE — 81001 URINALYSIS AUTO W/SCOPE: CPT

## 2025-01-21 PROCEDURE — 82803 BLOOD GASES ANY COMBINATION: CPT

## 2025-01-21 PROCEDURE — 99285 EMERGENCY DEPT VISIT HI MDM: CPT

## 2025-01-21 PROCEDURE — 700111 HCHG RX REV CODE 636 W/ 250 OVERRIDE (IP): Performed by: EMERGENCY MEDICINE

## 2025-01-21 PROCEDURE — 99223 1ST HOSP IP/OBS HIGH 75: CPT | Performed by: HOSPITALIST

## 2025-01-21 PROCEDURE — 86901 BLOOD TYPING SEROLOGIC RH(D): CPT

## 2025-01-21 PROCEDURE — HZ2ZZZZ DETOXIFICATION SERVICES FOR SUBSTANCE ABUSE TREATMENT: ICD-10-PCS | Performed by: HOSPITALIST

## 2025-01-21 PROCEDURE — 96374 THER/PROPH/DIAG INJ IV PUSH: CPT

## 2025-01-21 PROCEDURE — 80053 COMPREHEN METABOLIC PANEL: CPT

## 2025-01-21 PROCEDURE — A9270 NON-COVERED ITEM OR SERVICE: HCPCS | Performed by: HOSPITALIST

## 2025-01-21 PROCEDURE — 86900 BLOOD TYPING SEROLOGIC ABO: CPT | Mod: 91

## 2025-01-21 PROCEDURE — 700111 HCHG RX REV CODE 636 W/ 250 OVERRIDE (IP): Mod: JZ | Performed by: HOSPITALIST

## 2025-01-21 PROCEDURE — 700101 HCHG RX REV CODE 250: Performed by: HOSPITALIST

## 2025-01-21 PROCEDURE — 85730 THROMBOPLASTIN TIME PARTIAL: CPT

## 2025-01-21 PROCEDURE — 0241U HCHG SARS-COV-2 COVID-19 NFCT DS RESP RNA 4 TRGT MIC: CPT

## 2025-01-21 PROCEDURE — 82962 GLUCOSE BLOOD TEST: CPT

## 2025-01-21 PROCEDURE — 83735 ASSAY OF MAGNESIUM: CPT

## 2025-01-21 PROCEDURE — 85027 COMPLETE CBC AUTOMATED: CPT

## 2025-01-21 PROCEDURE — 83605 ASSAY OF LACTIC ACID: CPT

## 2025-01-21 PROCEDURE — 93005 ELECTROCARDIOGRAM TRACING: CPT | Mod: TC | Performed by: EMERGENCY MEDICINE

## 2025-01-21 PROCEDURE — 86850 RBC ANTIBODY SCREEN: CPT

## 2025-01-21 PROCEDURE — 94760 N-INVAS EAR/PLS OXIMETRY 1: CPT

## 2025-01-21 PROCEDURE — 85025 COMPLETE CBC W/AUTO DIFF WBC: CPT

## 2025-01-21 PROCEDURE — 71045 X-RAY EXAM CHEST 1 VIEW: CPT

## 2025-01-21 PROCEDURE — 85610 PROTHROMBIN TIME: CPT

## 2025-01-21 PROCEDURE — 82010 KETONE BODYS QUAN: CPT

## 2025-01-21 PROCEDURE — 700105 HCHG RX REV CODE 258: Performed by: EMERGENCY MEDICINE

## 2025-01-21 RX ORDER — OMEPRAZOLE 20 MG/1
20 CAPSULE, DELAYED RELEASE ORAL DAILY
Status: DISCONTINUED | OUTPATIENT
Start: 2025-01-21 | End: 2025-01-22

## 2025-01-21 RX ORDER — ENOXAPARIN SODIUM 100 MG/ML
40 INJECTION SUBCUTANEOUS DAILY
Status: DISCONTINUED | OUTPATIENT
Start: 2025-01-21 | End: 2025-01-25 | Stop reason: HOSPADM

## 2025-01-21 RX ORDER — MORPHINE SULFATE 4 MG/ML
2 INJECTION INTRAVENOUS
Status: DISCONTINUED | OUTPATIENT
Start: 2025-01-21 | End: 2025-01-25

## 2025-01-21 RX ORDER — INSULIN LISPRO 100 [IU]/ML
3-14 INJECTION, SOLUTION INTRAVENOUS; SUBCUTANEOUS
Status: DISCONTINUED | OUTPATIENT
Start: 2025-01-21 | End: 2025-01-22

## 2025-01-21 RX ORDER — CHLORDIAZEPOXIDE HYDROCHLORIDE 25 MG/1
25 CAPSULE, GELATIN COATED ORAL EVERY 6 HOURS
Status: DISCONTINUED | OUTPATIENT
Start: 2025-01-22 | End: 2025-01-23

## 2025-01-21 RX ORDER — LABETALOL HYDROCHLORIDE 5 MG/ML
10 INJECTION, SOLUTION INTRAVENOUS EVERY 4 HOURS PRN
Status: DISCONTINUED | OUTPATIENT
Start: 2025-01-21 | End: 2025-01-25 | Stop reason: HOSPADM

## 2025-01-21 RX ORDER — LORAZEPAM 1 MG/1
1 TABLET ORAL EVERY 4 HOURS PRN
Status: DISCONTINUED | OUTPATIENT
Start: 2025-01-21 | End: 2025-01-23

## 2025-01-21 RX ORDER — LORAZEPAM 2 MG/ML
1 INJECTION INTRAMUSCULAR ONCE
Status: COMPLETED | OUTPATIENT
Start: 2025-01-21 | End: 2025-01-21

## 2025-01-21 RX ORDER — SODIUM CHLORIDE 9 MG/ML
INJECTION, SOLUTION INTRAVENOUS CONTINUOUS
Status: DISCONTINUED | OUTPATIENT
Start: 2025-01-21 | End: 2025-01-23

## 2025-01-21 RX ORDER — LORAZEPAM 1 MG/1
3 TABLET ORAL
Status: DISCONTINUED | OUTPATIENT
Start: 2025-01-21 | End: 2025-01-23

## 2025-01-21 RX ORDER — FOLIC ACID 1 MG/1
1 TABLET ORAL DAILY
Status: DISCONTINUED | OUTPATIENT
Start: 2025-01-21 | End: 2025-01-24

## 2025-01-21 RX ORDER — CHLORDIAZEPOXIDE HYDROCHLORIDE 25 MG/1
50 CAPSULE, GELATIN COATED ORAL EVERY 6 HOURS
Status: ACTIVE | OUTPATIENT
Start: 2025-01-21 | End: 2025-01-22

## 2025-01-21 RX ORDER — DEXTROSE MONOHYDRATE 25 G/50ML
25 INJECTION, SOLUTION INTRAVENOUS
Status: DISCONTINUED | OUTPATIENT
Start: 2025-01-21 | End: 2025-01-22

## 2025-01-21 RX ORDER — GAUZE BANDAGE 2" X 2"
100 BANDAGE TOPICAL DAILY
Status: DISCONTINUED | OUTPATIENT
Start: 2025-01-21 | End: 2025-01-24

## 2025-01-21 RX ORDER — PROCHLORPERAZINE EDISYLATE 5 MG/ML
5-10 INJECTION INTRAMUSCULAR; INTRAVENOUS EVERY 4 HOURS PRN
Status: DISCONTINUED | OUTPATIENT
Start: 2025-01-21 | End: 2025-01-25 | Stop reason: HOSPADM

## 2025-01-21 RX ORDER — LORAZEPAM 2 MG/ML
0.5 INJECTION INTRAMUSCULAR EVERY 4 HOURS PRN
Status: DISCONTINUED | OUTPATIENT
Start: 2025-01-21 | End: 2025-01-23

## 2025-01-21 RX ORDER — SODIUM CHLORIDE, SODIUM LACTATE, POTASSIUM CHLORIDE, CALCIUM CHLORIDE 600; 310; 30; 20 MG/100ML; MG/100ML; MG/100ML; MG/100ML
1000 INJECTION, SOLUTION INTRAVENOUS ONCE
Status: COMPLETED | OUTPATIENT
Start: 2025-01-21 | End: 2025-01-21

## 2025-01-21 RX ORDER — ACETAMINOPHEN 325 MG/1
650 TABLET ORAL EVERY 6 HOURS PRN
Status: DISCONTINUED | OUTPATIENT
Start: 2025-01-21 | End: 2025-01-25 | Stop reason: HOSPADM

## 2025-01-21 RX ORDER — OXYCODONE HYDROCHLORIDE 5 MG/1
2.5 TABLET ORAL
Status: DISCONTINUED | OUTPATIENT
Start: 2025-01-21 | End: 2025-01-25

## 2025-01-21 RX ORDER — CLONIDINE HYDROCHLORIDE 0.1 MG/1
0.1 TABLET ORAL
Status: DISCONTINUED | OUTPATIENT
Start: 2025-01-21 | End: 2025-01-25 | Stop reason: HOSPADM

## 2025-01-21 RX ORDER — LORAZEPAM 1 MG/1
4 TABLET ORAL
Status: DISCONTINUED | OUTPATIENT
Start: 2025-01-21 | End: 2025-01-23

## 2025-01-21 RX ORDER — HALOPERIDOL 5 MG/ML
5 INJECTION INTRAMUSCULAR EVERY 4 HOURS PRN
Status: DISCONTINUED | OUTPATIENT
Start: 2025-01-21 | End: 2025-01-25 | Stop reason: HOSPADM

## 2025-01-21 RX ORDER — OXYCODONE HYDROCHLORIDE 5 MG/1
5 TABLET ORAL
Status: DISCONTINUED | OUTPATIENT
Start: 2025-01-21 | End: 2025-01-25

## 2025-01-21 RX ORDER — PANTOPRAZOLE SODIUM 40 MG/10ML
80 INJECTION, POWDER, LYOPHILIZED, FOR SOLUTION INTRAVENOUS ONCE
Status: CANCELLED | OUTPATIENT
Start: 2025-01-21 | End: 2025-01-21

## 2025-01-21 RX ORDER — GABAPENTIN 300 MG/1
300 CAPSULE ORAL ONCE
Status: COMPLETED | OUTPATIENT
Start: 2025-01-21 | End: 2025-01-21

## 2025-01-21 RX ORDER — LORAZEPAM 2 MG/ML
1 INJECTION INTRAMUSCULAR
Status: DISCONTINUED | OUTPATIENT
Start: 2025-01-21 | End: 2025-01-23

## 2025-01-21 RX ORDER — AMOXICILLIN 250 MG
2 CAPSULE ORAL EVERY EVENING
Status: DISCONTINUED | OUTPATIENT
Start: 2025-01-21 | End: 2025-01-25 | Stop reason: HOSPADM

## 2025-01-21 RX ORDER — MAGNESIUM SULFATE HEPTAHYDRATE 40 MG/ML
4 INJECTION, SOLUTION INTRAVENOUS ONCE
Status: COMPLETED | OUTPATIENT
Start: 2025-01-21 | End: 2025-01-21

## 2025-01-21 RX ORDER — PROMETHAZINE HYDROCHLORIDE 25 MG/1
12.5-25 SUPPOSITORY RECTAL EVERY 4 HOURS PRN
Status: DISCONTINUED | OUTPATIENT
Start: 2025-01-21 | End: 2025-01-25 | Stop reason: HOSPADM

## 2025-01-21 RX ORDER — LORAZEPAM 0.5 MG/1
0.5 TABLET ORAL EVERY 4 HOURS PRN
Status: DISCONTINUED | OUTPATIENT
Start: 2025-01-21 | End: 2025-01-23

## 2025-01-21 RX ORDER — LORAZEPAM 2 MG/ML
1.5 INJECTION INTRAMUSCULAR
Status: DISCONTINUED | OUTPATIENT
Start: 2025-01-21 | End: 2025-01-23

## 2025-01-21 RX ORDER — LORAZEPAM 2 MG/ML
2 INJECTION INTRAMUSCULAR
Status: DISCONTINUED | OUTPATIENT
Start: 2025-01-21 | End: 2025-01-23

## 2025-01-21 RX ORDER — PROMETHAZINE HYDROCHLORIDE 25 MG/1
12.5-25 TABLET ORAL EVERY 4 HOURS PRN
Status: DISCONTINUED | OUTPATIENT
Start: 2025-01-21 | End: 2025-01-25 | Stop reason: HOSPADM

## 2025-01-21 RX ORDER — LORAZEPAM 1 MG/1
2 TABLET ORAL
Status: DISCONTINUED | OUTPATIENT
Start: 2025-01-21 | End: 2025-01-23

## 2025-01-21 RX ORDER — ONDANSETRON 2 MG/ML
4 INJECTION INTRAMUSCULAR; INTRAVENOUS EVERY 4 HOURS PRN
Status: DISCONTINUED | OUTPATIENT
Start: 2025-01-21 | End: 2025-01-25 | Stop reason: HOSPADM

## 2025-01-21 RX ORDER — POLYETHYLENE GLYCOL 3350 17 G/17G
1 POWDER, FOR SOLUTION ORAL
Status: DISCONTINUED | OUTPATIENT
Start: 2025-01-21 | End: 2025-01-25 | Stop reason: HOSPADM

## 2025-01-21 RX ORDER — ONDANSETRON 4 MG/1
4 TABLET, ORALLY DISINTEGRATING ORAL EVERY 4 HOURS PRN
Status: DISCONTINUED | OUTPATIENT
Start: 2025-01-21 | End: 2025-01-25 | Stop reason: HOSPADM

## 2025-01-21 RX ADMIN — POTASSIUM PHOSPHATE, MONOBASIC AND POTASSIUM PHOSPHATE, DIBASIC 30 MMOL: 224; 236 INJECTION, SOLUTION, CONCENTRATE INTRAVENOUS at 15:30

## 2025-01-21 RX ADMIN — ONDANSETRON 4 MG: 2 INJECTION INTRAMUSCULAR; INTRAVENOUS at 20:14

## 2025-01-21 RX ADMIN — OMEPRAZOLE 20 MG: 20 CAPSULE, DELAYED RELEASE ORAL at 16:19

## 2025-01-21 RX ADMIN — CHLORDIAZEPOXIDE HYDROCHLORIDE 50 MG: 25 CAPSULE ORAL at 16:43

## 2025-01-21 RX ADMIN — THERA TABS 1 TABLET: TAB at 16:19

## 2025-01-21 RX ADMIN — MAGNESIUM SULFATE HEPTAHYDRATE 4 G: 4 INJECTION, SOLUTION INTRAVENOUS at 18:01

## 2025-01-21 RX ADMIN — SODIUM CHLORIDE, POTASSIUM CHLORIDE, SODIUM LACTATE AND CALCIUM CHLORIDE 1000 ML: 600; 310; 30; 20 INJECTION, SOLUTION INTRAVENOUS at 13:45

## 2025-01-21 RX ADMIN — CHLORDIAZEPOXIDE HYDROCHLORIDE 50 MG: 25 CAPSULE ORAL at 21:00

## 2025-01-21 RX ADMIN — Medication 100 MG: at 16:19

## 2025-01-21 RX ADMIN — LORAZEPAM 1 MG: 2 INJECTION INTRAMUSCULAR; INTRAVENOUS at 20:11

## 2025-01-21 RX ADMIN — SODIUM CHLORIDE: 9 INJECTION, SOLUTION INTRAVENOUS at 17:57

## 2025-01-21 RX ADMIN — LORAZEPAM 1 MG: 2 INJECTION INTRAMUSCULAR; INTRAVENOUS at 13:26

## 2025-01-21 RX ADMIN — FOLIC ACID 1 MG: 1 TABLET ORAL at 16:19

## 2025-01-21 RX ADMIN — LORAZEPAM 0.5 MG: 2 INJECTION INTRAMUSCULAR; INTRAVENOUS at 22:21

## 2025-01-21 RX ADMIN — GABAPENTIN 300 MG: 300 CAPSULE ORAL at 22:14

## 2025-01-21 RX ADMIN — OCTREOTIDE ACETATE 50 MCG/HR: 200 INJECTION, SOLUTION INTRAVENOUS; SUBCUTANEOUS at 17:57

## 2025-01-21 RX ADMIN — LORAZEPAM 1.5 MG: 2 INJECTION INTRAMUSCULAR; INTRAVENOUS at 16:20

## 2025-01-21 ASSESSMENT — ENCOUNTER SYMPTOMS
LOSS OF CONSCIOUSNESS: 0
DOUBLE VISION: 0
SHORTNESS OF BREATH: 1
HEADACHES: 1
EYES NEGATIVE: 1
WHEEZING: 0
MEMORY LOSS: 0
FEVER: 0
HEMOPTYSIS: 0
DIZZINESS: 0
SEIZURES: 0
SORE THROAT: 0
CONSTIPATION: 1
DIARRHEA: 0
MYALGIAS: 1
DEPRESSION: 0
NERVOUS/ANXIOUS: 0
NAUSEA: 1
VOMITING: 0
PALPITATIONS: 0
CHILLS: 1
DIAPHORESIS: 0
HEARTBURN: 1
COUGH: 0
BLOOD IN STOOL: 0
ABDOMINAL PAIN: 1
FOCAL WEAKNESS: 0
BRUISES/BLEEDS EASILY: 0

## 2025-01-21 ASSESSMENT — COPD QUESTIONNAIRES
DURING THE PAST 4 WEEKS HOW MUCH DID YOU FEEL SHORT OF BREATH: NONE/LITTLE OF THE TIME
DO YOU EVER COUGH UP ANY MUCUS OR PHLEGM?: YES, A FEW DAYS A WEEK OR MONTH
HAVE YOU SMOKED AT LEAST 100 CIGARETTES IN YOUR ENTIRE LIFE: YES
COPD SCREENING SCORE: 4

## 2025-01-21 ASSESSMENT — LIFESTYLE VARIABLES
AGITATION: NORMAL ACTIVITY
PAROXYSMAL SWEATS: BARELY PERCEPTIBLE SWEATING. PALMS MOIST
HEADACHE, FULLNESS IN HEAD: NOT PRESENT
AUDITORY DISTURBANCES: NOT PRESENT
HEADACHE, FULLNESS IN HEAD: MILD
ANXIETY: *
NAUSEA AND VOMITING: *
TREMOR: *
AUDITORY DISTURBANCES: NOT PRESENT
VISUAL DISTURBANCES: NOT PRESENT
NAUSEA AND VOMITING: MILD NAUSEA WITH NO VOMITING
VISUAL DISTURBANCES: NOT PRESENT
TOTAL SCORE: 15
ANXIETY: *
AUDITORY DISTURBANCES: NOT PRESENT
ORIENTATION AND CLOUDING OF SENSORIUM: ORIENTED AND CAN DO SERIAL ADDITIONS
HEADACHE, FULLNESS IN HEAD: NOT PRESENT
TOTAL SCORE: 4
ANXIETY: MILDLY ANXIOUS
ANXIETY: MILDLY ANXIOUS
SUBSTANCE_ABUSE: 1
ORIENTATION AND CLOUDING OF SENSORIUM: ORIENTED AND CAN DO SERIAL ADDITIONS
AGITATION: NORMAL ACTIVITY
ORIENTATION AND CLOUDING OF SENSORIUM: ORIENTED AND CAN DO SERIAL ADDITIONS
TREMOR: *
VISUAL DISTURBANCES: NOT PRESENT
HEADACHE, FULLNESS IN HEAD: NOT PRESENT
AGITATION: *
AGITATION: NORMAL ACTIVITY
VISUAL DISTURBANCES: MILD SENSITIVITY
ORIENTATION AND CLOUDING OF SENSORIUM: ORIENTED AND CAN DO SERIAL ADDITIONS
NAUSEA AND VOMITING: *
TREMOR: *
TREMOR: *
TOTAL SCORE: 11
PAROXYSMAL SWEATS: BARELY PERCEPTIBLE SWEATING. PALMS MOIST
NAUSEA AND VOMITING: *
AUDITORY DISTURBANCES: NOT PRESENT
PAROXYSMAL SWEATS: NO SWEAT VISIBLE
TOTAL SCORE: 8
PAROXYSMAL SWEATS: NO SWEAT VISIBLE

## 2025-01-21 ASSESSMENT — COGNITIVE AND FUNCTIONAL STATUS - GENERAL
WALKING IN HOSPITAL ROOM: A LITTLE
STANDING UP FROM CHAIR USING ARMS: A LITTLE
SUGGESTED CMS G CODE MODIFIER MOBILITY: CK
TURNING FROM BACK TO SIDE WHILE IN FLAT BAD: A LITTLE
MOVING FROM LYING ON BACK TO SITTING ON SIDE OF FLAT BED: A LITTLE
MOBILITY SCORE: 18
SUGGESTED CMS G CODE MODIFIER DAILY ACTIVITY: CH
MOVING TO AND FROM BED TO CHAIR: A LITTLE
CLIMB 3 TO 5 STEPS WITH RAILING: A LITTLE
DAILY ACTIVITIY SCORE: 24

## 2025-01-21 ASSESSMENT — FIBROSIS 4 INDEX: FIB4 SCORE: 0.61

## 2025-01-21 ASSESSMENT — PAIN DESCRIPTION - PAIN TYPE
TYPE: ACUTE PAIN
TYPE: ACUTE PAIN

## 2025-01-21 NOTE — ED TRIAGE NOTES
[facility] for following complaints.     Chief Complaint   Patient presents with    Bloody Stools     Pt sent here from [place] for endoscopy r/t intractable nausea vomiting and upper GIB. Pt recently stopped drinking, etoh withdrawal and N/V. Pt started on octreotide gtt at outside facility.      BP (!) 147/95   Pulse (!) 125   Temp 36.3 °C (97.4 °F) (Temporal)   Wt 72.6 kg (160 lb)   SpO2 96%   BMI 28.34 kg/m²

## 2025-01-21 NOTE — H&P
Hospital Medicine History & Physical Note    Date of Service  1/21/2025    Primary Care Physician  No primary care provider on file.    Consultants  None    Specialist Names: None    Code Status  Full Code    Chief Complaint  Chief Complaint   Patient presents with    Bloody Stools     Pt sent here from [place] for endoscopy r/t intractable nausea vomiting and upper GIB. Pt recently stopped drinking, etoh withdrawal and N/V. Pt started on octreotide gtt at outside facility.        History of Presenting Illness  Karlie Nathan is a 25 y.o. male who presented 1/21/2025 with generalized weakness and wanting to stop alcohol use.  The patient reports nausea vomiting with blood in it.  Initiate octreotide drip.  Patient has metabolic acidosis will initiate fluid resuscitation.  Patient uses marijuana extensively and he will be placed on Haldol as needed for anxiety from her marijuana withdrawal.  Patient drinks about a pint of Jack Parham per day.  He does not want to stop drinking.  For his alcohol withdrawal management patient will be continued on CIWA protocol.  Will replace electrolytes, will start him on thiamine folic acid multivitamin, magnesium at this point is down to 1.2 and phosphorus is 1.9 and at this point these will need to be replaced.  Will carefully monitor his CIWA score if he should decline and become unmanageable for the floor we will need to transfer him to ICU.  He is full CODE STATUS which I have checked with him.    I discussed the plan of care with patient, bedside RN, and emergency room physician Dr. Sergo Kumar .    Review of Systems  Review of Systems   Constitutional:  Positive for chills and malaise/fatigue. Negative for diaphoresis and fever.   HENT: Negative.  Negative for hearing loss, nosebleeds and sore throat.    Eyes: Negative.  Negative for double vision.   Respiratory:  Positive for shortness of breath. Negative for cough, hemoptysis and wheezing.    Cardiovascular:  Positive  for chest pain. Negative for palpitations and leg swelling.   Gastrointestinal:  Positive for abdominal pain, constipation, heartburn and nausea. Negative for blood in stool, diarrhea and vomiting.   Genitourinary: Negative.  Negative for frequency, hematuria and urgency.   Musculoskeletal:  Positive for myalgias. Negative for joint pain.   Skin: Negative.  Negative for itching and rash.   Neurological:  Positive for headaches. Negative for dizziness, focal weakness, seizures and loss of consciousness.   Endo/Heme/Allergies: Negative.  Does not bruise/bleed easily.   Psychiatric/Behavioral:  Positive for substance abuse (Alcohol and marijuana). Negative for depression, memory loss and suicidal ideas. The patient is not nervous/anxious.    All other systems reviewed and are negative.      Past Medical History   has a past medical history of Diabetes (HCC).    Surgical History   has no past surgical history on file.     Family History  family history is not on file.   Family history reviewed with patient. There is no family history that is pertinent to the chief complaint.     Social History   reports that he has been smoking. He has quit using smokeless tobacco. He reports that he does not currently use alcohol. He reports that he does not currently use drugs.    Allergies  No Known Allergies    Medications  Prior to Admission Medications   Prescriptions Last Dose Informant Patient Reported? Taking?   Acetaminophen (TYLENOL PO) 1/19/2025 Patient Yes Yes   Sig: Take 2 Tablets by mouth 2 times a day as needed (For pain). Pt is not sure the strength (OTC)   insulin glargine (LANTUS) 100 UNIT/ML Solution Not Taking Patient No No   Sig: Inject 35 Units under the skin every evening.   Patient not taking: Reported on 1/21/2025   insulin infusion pump Device  Patient Yes Yes   Sig: Inject  under the skin. Patient's own SQ insulin pump. Disconnect pump if patient becomes hypoglycemic and altered. Contact the provider if there  is a pump malfunction/failure, if there are concerns with the patient/guardian's ability to self-manage their pump, the patient becomes altered, or if blood glucose >300 after two scheduled, consecutive fingersticks.  Patient may give additional boluses for snacks or corrections; nurse to chart these when administered by the patient.    Type of Pump:  Closed-loop system:   Date of last tubing change: 1/18/2025 - Change tubing and site every 72 hours    Type of Insulin: Humalog  Dosing:  Basal rate:    -  =  units/hr    -  =  units/hr    -  =  units/hr  Bolus ratio:    unit :  g carbohydrate at  (breakfast, lunch, dinner, snacks; or during certain hours)    unit :  g carbohydrate at  (breakfast, lunch, dinner, snacks; or during certain hours)  Correction ratio:    units for every  over  mg/dL at     units for every  over  mg/dL at   insulin lispro (HUMALOG) 100 UNIT/ML Not Taking Patient No No   Sig: Inject 3-14 Units under the skin 3 times a day before meals. Per sliding scale   Patient not taking: Reported on 1/21/2025      Facility-Administered Medications: None       Physical Exam  Temp:  [36.3 °C (97.4 °F)] 36.3 °C (97.4 °F)  Pulse:  [107-125] 115  Resp:  [20] 20  BP: (147)/(95) 147/95  SpO2:  [96 %-98 %] 98 %  Blood Pressure: (!) 147/95   Temperature: 36.3 °C (97.4 °F)   Pulse: (!) 115   Respiration: 20   Pulse Oximetry: 98 %       Physical Exam  Vitals and nursing note reviewed. Exam conducted with a chaperone present.   Constitutional:       General: He is not in acute distress.     Appearance: Normal appearance. He is well-developed and normal weight. He is ill-appearing. He is not toxic-appearing or diaphoretic.   HENT:      Head: Normocephalic and atraumatic.      Right Ear: External ear normal.      Left Ear: External ear normal.      Nose: Nose normal.      Mouth/Throat:      Mouth: Mucous membranes are dry.      Pharynx: Oropharynx is clear. No oropharyngeal exudate or posterior oropharyngeal erythema.    Eyes:      General:         Right eye: No discharge.         Left eye: No discharge.      Extraocular Movements: Extraocular movements intact.      Conjunctiva/sclera: Conjunctivae normal.      Pupils: Pupils are equal, round, and reactive to light.   Neck:      Thyroid: No thyromegaly.      Vascular: No carotid bruit or JVD.   Cardiovascular:      Rate and Rhythm: Regular rhythm. Tachycardia present.      Pulses: Normal pulses.      Heart sounds: Normal heart sounds.   Pulmonary:      Effort: Pulmonary effort is normal. Tachypnea present.      Breath sounds: Normal breath sounds. No decreased air movement. No decreased breath sounds or wheezing.   Chest:      Chest wall: No tenderness.   Abdominal:      General: Abdomen is flat. Bowel sounds are increased. There is no distension or abdominal bruit. There are no signs of injury.      Palpations: Abdomen is soft. There is no shifting dullness, fluid wave, mass or pulsatile mass.      Tenderness: There is generalized abdominal tenderness. There is no guarding or rebound.   Musculoskeletal:         General: Normal range of motion.      Cervical back: Normal range of motion and neck supple.      Right lower leg: No edema.      Left lower leg: No edema.   Lymphadenopathy:      Cervical: No cervical adenopathy.   Skin:     General: Skin is warm and dry.      Capillary Refill: Capillary refill takes less than 2 seconds.      Findings: No rash.   Neurological:      General: No focal deficit present.      Mental Status: He is alert and oriented to person, place, and time. Mental status is at baseline.      GCS: GCS eye subscore is 4. GCS verbal subscore is 5. GCS motor subscore is 6.      Cranial Nerves: No cranial nerve deficit.      Motor: Tremor present.      Deep Tendon Reflexes: Reflexes are normal and symmetric.   Psychiatric:         Mood and Affect: Mood normal.         Behavior: Behavior normal.         Thought Content: Thought content normal.         Judgment:  "Judgment normal.         Laboratory:  Recent Labs     01/21/25  1313   WBC 14.1*   RBC 5.24   HEMOGLOBIN 16.5   HEMATOCRIT 48.9   MCV 93.3   MCH 31.5   MCHC 33.7   RDW 49.1   PLATELETCT 239   MPV 11.2     Recent Labs     01/21/25  1313   SODIUM 141   POTASSIUM 3.9   CHLORIDE 102   CO2 17*   GLUCOSE 126*   BUN 7*   CREATININE 0.76   CALCIUM 9.4     Recent Labs     01/21/25  1313   ALTSGPT 18   ASTSGOT 23   ALKPHOSPHAT 76   TBILIRUBIN 0.7   LIPASE 5*   GLUCOSE 126*     Recent Labs     01/21/25  1313   APTT 26.4   INR 1.08     No results for input(s): \"NTPROBNP\" in the last 72 hours.      No results for input(s): \"TROPONINT\" in the last 72 hours.    Imaging:  DX-CHEST-PORTABLE (1 VIEW)   Final Result      Cardiomegaly.          X-Ray:  I have personally reviewed the images and compared with prior images.  EKG:  I have personally reviewed the images and compared with prior images.    Assessment/Plan:  Justification for Admission Status  I anticipate this patient will require at least two midnights for appropriate medical management, necessitating inpatient admission because patient is undergoing alcohol withdrawal with severe metabolic acidosis and will require least 48 hours of inpatient management to stabilize her alcohol withdrawal management.      Patient will need a Telemetry bed on MEDICAL service .  The need is secondary to alcohol withdrawal.    * Alcohol withdrawal syndrome with complication (HCC)- (present on admission)  Assessment & Plan  Patient drinks about a pint of Anand Parham per day  He wants to stop drinking  Patient currently is experiencing heartburn, generalized abdominal pain and reported vomiting some blood.  Initiate CIWA protocol  Initiate thiamine folic acid multivitamin  Correct electrolytes  Initiate octreotide drip  Start Librium 50 mg every 6 hours for 4 doses followed by 25 mg every 6 hours for 8 doses.    Hypophosphatemia- (present on admission)  Assessment & Plan  Hypophosphatemia down " to 1.9  Give potassium phosphate 30 mmol x 1 dose in the IV form.  Monitor phosphorus levels every morning for 72 hours    Hypomagnesemia- (present on admission)  Assessment & Plan  Magnesium is down to 1.2  Give 4 g of IV magnesium and then repeat level in the morning  Will continue monitoring magnesium levels every morning for the next 72 hours    Leukocytosis- (present on admission)  Assessment & Plan  Mild leukocytosis most likely stress response from him undergoing alcohol withdrawal.  Continue to monitor  Currently no antibiotics at this point are warranted.    Metabolic acidosis- (present on admission)  Assessment & Plan  Secondary to the acute alcohol withdrawal patient has developed metabolic acidosis  Continue to monitor  Beta-hydroxybutyrate acid is elevated at 8  VBG does show some acidosis but not to the point where he needs to be intubated or admitted to the ICU  Bicarb is 17 on the metabolic panel    Diabetes mellitus type 1 (HCC)- (present on admission)  Assessment & Plan  Obtain hemoglobin A1c level  Diabetic diet  Accu-Cheks with sliding scale coverage  Patient has an insulin pump which he refilled about 48 hours ago and will need to be refilled again.    GI bleed  Assessment & Plan  Mild GI bleed this is most likely secondary to esophageal varices and so I will put him on octreotide  Monitor H&H every 8 hours  Transfuse if hemoglobin hematocrit are below 7 or 21.    Marijuana user- (present on admission)  Assessment & Plan  Chronic marijuana user he smokes excessive amounts of marijuana every day at this point he has been advised that he cannot smoke while he is in the hospital.  Use Haldol if needed to curb his agitation or anxiety from marijuana withdrawal        VTE prophylaxis: SCDs/TEDs and enoxaparin ppx

## 2025-01-21 NOTE — ASSESSMENT & PLAN NOTE
Patient drinks about a pint of Anand Parham per day  He wants to stop drinking  Patient currently is experiencing heartburn, generalized abdominal pain and reported vomiting some blood.  Continue CIWA protocol  Initiate thiamine folic acid multivitamin  Correct electrolytes  Stop octreotide drip as no evidence of varices per GI  Continue Librium 25 mg every 6 hours for 8 doses.

## 2025-01-21 NOTE — ASSESSMENT & PLAN NOTE
Secondary to the acute alcohol withdrawal patient has developed metabolic acidosis  Continue to monitor  Beta-hydroxybutyrate acid is elevated at 8  VBG does show some acidosis but not to the point where he needs to be intubated or admitted to the ICU  Bicarb is 17 on the metabolic panel

## 2025-01-21 NOTE — ASSESSMENT & PLAN NOTE
Give potassium phosphate 30 mmol x 1 dose in the IV form.  Monitor phosphorus levels every morning for 72 hours

## 2025-01-21 NOTE — ASSESSMENT & PLAN NOTE
No varices - stop octreotide  Start bid IV protonix per GI  Stop serial h/h  Clear diet and adat to diabetic diet

## 2025-01-21 NOTE — ED PROVIDER NOTES
ED Provider Note    CHIEF COMPLAINT  Chief Complaint   Patient presents with    Bloody Stools     Pt sent here from [facility] for endoscopy r/t intractable nausea vomiting and upper GIB. Pt recently stopped drinking, etoh withdrawal and N/V. Pt started on octreotide gtt at outside facility.        EXTERNAL RECORDS REVIEWED  External ED Note patient was seen today at external ER with vomiting blood    HPI/ROS  LIMITATION TO HISTORY   Select: Confusion  OUTSIDE HISTORIAN(S):  EMS    Karlie Nathan is a 25 y.o. male who presents with past medical history seen for type 1 diabetes, he uses marijuana and alcohol frequently, he presented to [facility] with vomiting and diarrhea 3 days in a row.  Today he had acidosis on his CMP, there was concern for GI bleed with blood and black vomit.  The patient is a poor historian here.    PAST MEDICAL HISTORY   has a past medical history of Diabetes (HCC).    SURGICAL HISTORY  patient denies any surgical history    FAMILY HISTORY  History reviewed. No pertinent family history.    SOCIAL HISTORY  Social History     Tobacco Use    Smoking status: Every Day    Smokeless tobacco: Former    Tobacco comments:     Cannabis   Vaping Use    Vaping status: Never Used   Substance and Sexual Activity    Alcohol use: Not Currently     Comment: 1/2 gallon per day, recently stopped    Drug use: Not Currently     Comment: weed, recently stopped    Sexual activity: Not on file       CURRENT MEDICATIONS  Home Medications       Reviewed by Nish Alvarez R.N. (Registered Nurse) on 01/21/25 at 1304  Med List Status: Not Addressed     Medication Last Dose Status   haloperidol (HALDOL) 5 MG Tab  Active   insulin glargine (LANTUS) 100 UNIT/ML Solution  Active   insulin lispro (HUMALOG) 100 UNIT/ML  Active   ondansetron (ZOFRAN ODT) 4 MG TABLET DISPERSIBLE  Active                           ALLERGIES  No Known Allergies    PHYSICAL EXAM  VITAL SIGNS: BP (!) 147/95   Pulse (!) 125   Temp 36.3 °C (97.4  °F) (Temporal)   Resp 20   Wt 72.6 kg (160 lb)   SpO2 96%   BMI 28.34 kg/m²      Constitutional: Alert.  Confused.  HENT: No signs of trauma, Bilateral external ears normal, Nose normal.   Eyes: Pupils are equal and reactive, Conjunctiva normal, Non-icteric.   Neck: Normal range of motion, No tenderness, Supple, No stridor.   Lymphatic: No lymphadenopathy noted.   Cardiovascular: Tachycardic with no murmurs.  Thorax & Lungs: Normal breath sounds, No respiratory distress, No wheezing, No chest tenderness.   Abdomen: Bowel sounds normal, Soft, No tenderness, No peritoneal signs, No masses, No pulsatile masses.   Skin: Warm, Dry, No erythema, No rash.   Back: No bony tenderness, No CVA tenderness.   Extremities: Intact distal pulses, No edema, No tenderness, No cyanosis.  Musculoskeletal: Good range of motion in all major joints. No tenderness to palpation or major deformities noted.   Neurologic: Alert , Normal motor function, Normal sensory function, No focal deficits noted.   Psychiatric: Affect normal, Judgment normal, Mood normal.       EKG/LABS    This is a twelve-lead EKG interpretation by myself.  It is sinus tachycardia at a rate of 113.  The axis is normal.  The intervals are normal.  There is no ST elevation or depression.  My impression of this EKG, sinus tachycardia, does not meet STEMI criteria at this time.  I have independently interpreted this EKG    Labs Reviewed   CBC WITH DIFFERENTIAL - Abnormal; Notable for the following components:       Result Value    WBC 14.1 (*)     Neutrophils-Polys 82.90 (*)     Lymphocytes 10.20 (*)     Neutrophils (Absolute) 11.65 (*)     All other components within normal limits   COMP METABOLIC PANEL - Abnormal; Notable for the following components:    Co2 17 (*)     Anion Gap 22.0 (*)     Glucose 126 (*)     Bun 7 (*)     All other components within normal limits   LIPASE - Abnormal; Notable for the following components:    Lipase 5 (*)     All other components  within normal limits   MAGNESIUM - Abnormal; Notable for the following components:    Magnesium 1.2 (*)     All other components within normal limits   PHOSPHORUS - Abnormal; Notable for the following components:    Phosphorus 1.9 (*)     All other components within normal limits   BETA-HYDROXYBUTYRIC ACID - Abnormal; Notable for the following components:    beta-Hydroxybutyric Acid >8.00 (*)     All other components within normal limits   VENOUS BLOOD GAS - Abnormal; Notable for the following components:    Venous Bg Pco2 32.1 (*)     Venous Bg Po2 50.0 (*)     Venous Bg Hco3 16 (*)     Venous Bg Base Excess -9 (*)     All other components within normal limits   PROTHROMBIN TIME   APTT   COD (ADULT)   LACTIC ACID   COV-2, FLU A/B, AND RSV BY PCR ([facility])   ETHYL ALCOHOL (BLOOD)   ABO RH CONFIRM   ESTIMATED GFR   URINE DRUG SCREEN   URINALYSIS         RADIOLOGY/PROCEDURES   I have independently interpreted the diagnostic imaging associated with this visit and am waiting the final reading from the radiologist.   My preliminary interpretation is as follows: Chest x-ray consistent with cardiomegaly    Radiologist interpretation:  DX-CHEST-PORTABLE (1 VIEW)   Final Result      Cardiomegaly.          COURSE & MEDICAL DECISION MAKING    ASSESSMENT, COURSE AND PLAN  Care Narrative:     Patient presents with possible GI bleed, possible DKA, vomiting, diarrhea, he says he is coughing.    1:59 PM patient's white blood count is elevated 14.1.  H&H normal 15 and 48.  He has increased neutrophils 82%.  His VBG shows a normal pH 7.3.  Chest x-ray shows cardiomegaly.  Magnesium low 1.2.  Phosphorus low 1.9.  Alcohol less than 10.  CO2 low 17 anion gap elevated 22 glucose elevated 126 BUN low at 7.  Patient's H&H is normal.  At this point it does not appear that he is having a significant GI bleed but rather he is tachycardic and has findings consistent with alcohol withdrawal.  He is acidotic.  I spoke with the [facility] hospitalist  to assess the patient for hospitalization.      Hydration: Based on the patient's presentation of Acute Vomiting, CIWA, Dehydration, GI Bleed / Upset, Inability to take oral fluids, and Tachycardia the patient was given IV fluids. IV Hydration was used because oral hydration was not adequate alone. Upon recheck following hydration, the patient was needing further fluid resuscitation.          ADDITIONAL PROBLEMS MANAGED  Vomiting, diarrhea, acidosis, polysubstance abuse, leukocytosis    DISPOSITION AND DISCUSSIONS  I have discussed management of the patient with the following physicians and THANIA's: Spoke with [facility] hospitalist to assess the patient for hospitalization.    Discussion of management with other hospitals or appropriate source(s): Pharmacy we discussed stopping the octreotide and Protonix, the patient does not appear to be having a significant GI bleed.    Escalation of care considered, and ultimately not performed: Spoke with the hospitalist concerning if the patient needs ICU and we agree at this time the patient is appropriate for the floor.    Barriers to care at this time, including but not limited to:  Patient has polysubstance abuse .     Decision tools and prescription drugs considered including, but not limited to:  IV fluids, IV Ativan for alcohol withdrawal given in the emergency department .      CRITICAL CARE  The very real possibilty of a deterioration of this patient's condition required the highest level of my preparedness for sudden, emergent intervention.  I provided critical care services, which included medication orders, frequent reevaluations of the patient's condition and response to treatment, ordering and reviewing test results, and discussing the case with various consultants.  The critical care time associated with the care of the patient was 40 minutes. Review chart for interventions. This time is exclusive of any other billable procedures.       FINAL DIAGNOSIS  1. Nausea and  vomiting, unspecified vomiting type    2. Diarrhea, unspecified type    3. Polysubstance abuse (HCC)    4. Alcohol withdrawal syndrome with complication (HCC)    5. Acidosis      Total critical care time 40 minutes as outlined above      Electronically signed by: Gagan Heath M.D., 1/21/2025 1:17 PM

## 2025-01-21 NOTE — ED NOTES
Medication history reviewed with pt. Med rec is complete.  Allergies reviewed, per pt    Pt told me that he was to tired to show me his setting on his Omnipod.  I informed the pharmacists       Pt reports that he has not used his LANTUS in the 2 months or longer.    Patient has not had any outpatient antibiotics in the last 30 days.    Pt is not on any anticoagulants

## 2025-01-21 NOTE — ASSESSMENT & PLAN NOTE
Obtain hemoglobin A1c level  Diabetic diet  Accu-Cheks with sliding scale coverage  Patient has an insulin pump which he refilled about 48 hours ago and will need to be refilled again.

## 2025-01-21 NOTE — ASSESSMENT & PLAN NOTE
Chronic marijuana user he smokes excessive amounts of marijuana every day at this point he has been advised that he cannot smoke while he is in the hospital.  Use Haldol if needed to curb his agitation or anxiety from marijuana withdrawal

## 2025-01-22 ENCOUNTER — ANESTHESIA (OUTPATIENT)
Dept: SURGERY | Facility: MEDICAL CENTER | Age: 25
End: 2025-01-22
Payer: MEDICAID

## 2025-01-22 ENCOUNTER — ANESTHESIA EVENT (OUTPATIENT)
Dept: SURGERY | Facility: MEDICAL CENTER | Age: 25
End: 2025-01-22
Payer: MEDICAID

## 2025-01-22 LAB
ANION GAP SERPL CALC-SCNC: 21 MMOL/L (ref 7–16)
BUN SERPL-MCNC: 7 MG/DL (ref 8–22)
CALCIUM SERPL-MCNC: 9 MG/DL (ref 8.4–10.2)
CHLORIDE SERPL-SCNC: 98 MMOL/L (ref 96–112)
CO2 SERPL-SCNC: 20 MMOL/L (ref 20–33)
CREAT SERPL-MCNC: 0.89 MG/DL (ref 0.5–1.4)
ERYTHROCYTE [DISTWIDTH] IN BLOOD BY AUTOMATED COUNT: 48.9 FL (ref 35.9–50)
ERYTHROCYTE [DISTWIDTH] IN BLOOD BY AUTOMATED COUNT: 49.3 FL (ref 35.9–50)
EST. AVERAGE GLUCOSE BLD GHB EST-MCNC: 171 MG/DL
GFR SERPLBLD CREATININE-BSD FMLA CKD-EPI: 120 ML/MIN/1.73 M 2
GLUCOSE BLD STRIP.AUTO-MCNC: 206 MG/DL (ref 65–99)
GLUCOSE BLD STRIP.AUTO-MCNC: 255 MG/DL (ref 65–99)
GLUCOSE BLD STRIP.AUTO-MCNC: 318 MG/DL (ref 65–99)
GLUCOSE BLD STRIP.AUTO-MCNC: 355 MG/DL (ref 65–99)
GLUCOSE SERPL-MCNC: 259 MG/DL (ref 65–99)
HBA1C MFR BLD: 7.6 % (ref 4–5.6)
HCT VFR BLD AUTO: 47.5 % (ref 42–52)
HCT VFR BLD AUTO: 51 % (ref 42–52)
HGB BLD-MCNC: 16.2 G/DL (ref 14–18)
HGB BLD-MCNC: 17.3 G/DL (ref 14–18)
MAGNESIUM SERPL-MCNC: 1.8 MG/DL (ref 1.5–2.5)
MCH RBC QN AUTO: 31.5 PG (ref 27–33)
MCH RBC QN AUTO: 31.9 PG (ref 27–33)
MCHC RBC AUTO-ENTMCNC: 33.9 G/DL (ref 32.3–36.5)
MCHC RBC AUTO-ENTMCNC: 34.1 G/DL (ref 32.3–36.5)
MCV RBC AUTO: 92.4 FL (ref 81.4–97.8)
MCV RBC AUTO: 93.9 FL (ref 81.4–97.8)
PHOSPHATE SERPL-MCNC: 2.1 MG/DL (ref 2.5–4.5)
PLATELET # BLD AUTO: 244 K/UL (ref 164–446)
PLATELET # BLD AUTO: 253 K/UL (ref 164–446)
PMV BLD AUTO: 11.2 FL (ref 9–12.9)
PMV BLD AUTO: 11.9 FL (ref 9–12.9)
POTASSIUM SERPL-SCNC: 4 MMOL/L (ref 3.6–5.5)
RBC # BLD AUTO: 5.14 M/UL (ref 4.7–6.1)
RBC # BLD AUTO: 5.43 M/UL (ref 4.7–6.1)
SODIUM SERPL-SCNC: 139 MMOL/L (ref 135–145)
WBC # BLD AUTO: 15.8 K/UL (ref 4.8–10.8)
WBC # BLD AUTO: 17.7 K/UL (ref 4.8–10.8)

## 2025-01-22 PROCEDURE — A9270 NON-COVERED ITEM OR SERVICE: HCPCS | Performed by: INTERNAL MEDICINE

## 2025-01-22 PROCEDURE — 160035 HCHG PACU - 1ST 60 MINS PHASE I: Performed by: INTERNAL MEDICINE

## 2025-01-22 PROCEDURE — 83735 ASSAY OF MAGNESIUM: CPT

## 2025-01-22 PROCEDURE — 94760 N-INVAS EAR/PLS OXIMETRY 1: CPT

## 2025-01-22 PROCEDURE — 160009 HCHG ANES TIME/MIN: Performed by: INTERNAL MEDICINE

## 2025-01-22 PROCEDURE — 82962 GLUCOSE BLOOD TEST: CPT | Mod: 91

## 2025-01-22 PROCEDURE — 80048 BASIC METABOLIC PNL TOTAL CA: CPT

## 2025-01-22 PROCEDURE — 700111 HCHG RX REV CODE 636 W/ 250 OVERRIDE (IP): Performed by: ANESTHESIOLOGY

## 2025-01-22 PROCEDURE — 770020 HCHG ROOM/CARE - TELE (206)

## 2025-01-22 PROCEDURE — 85027 COMPLETE CBC AUTOMATED: CPT | Mod: 91

## 2025-01-22 PROCEDURE — 700111 HCHG RX REV CODE 636 W/ 250 OVERRIDE (IP): Performed by: INTERNAL MEDICINE

## 2025-01-22 PROCEDURE — 700101 HCHG RX REV CODE 250: Performed by: ANESTHESIOLOGY

## 2025-01-22 PROCEDURE — 700111 HCHG RX REV CODE 636 W/ 250 OVERRIDE (IP): Performed by: HOSPITALIST

## 2025-01-22 PROCEDURE — 700102 HCHG RX REV CODE 250 W/ 637 OVERRIDE(OP): Performed by: HOSPITALIST

## 2025-01-22 PROCEDURE — 99233 SBSQ HOSP IP/OBS HIGH 50: CPT | Performed by: INTERNAL MEDICINE

## 2025-01-22 PROCEDURE — 700102 HCHG RX REV CODE 250 W/ 637 OVERRIDE(OP): Performed by: INTERNAL MEDICINE

## 2025-01-22 PROCEDURE — 0DJ08ZZ INSPECTION OF UPPER INTESTINAL TRACT, VIA NATURAL OR ARTIFICIAL OPENING ENDOSCOPIC: ICD-10-PCS | Performed by: INTERNAL MEDICINE

## 2025-01-22 PROCEDURE — 36415 COLL VENOUS BLD VENIPUNCTURE: CPT

## 2025-01-22 PROCEDURE — 700105 HCHG RX REV CODE 258: Performed by: INTERNAL MEDICINE

## 2025-01-22 PROCEDURE — A9270 NON-COVERED ITEM OR SERVICE: HCPCS | Performed by: HOSPITALIST

## 2025-01-22 PROCEDURE — 84100 ASSAY OF PHOSPHORUS: CPT

## 2025-01-22 PROCEDURE — 160203 HCHG ENDO MINUTES - 1ST 30 MINS LEVEL 4: Performed by: INTERNAL MEDICINE

## 2025-01-22 PROCEDURE — 160002 HCHG RECOVERY MINUTES (STAT): Performed by: INTERNAL MEDICINE

## 2025-01-22 PROCEDURE — 160048 HCHG OR STATISTICAL LEVEL 1-5: Performed by: INTERNAL MEDICINE

## 2025-01-22 PROCEDURE — 160202 HCHG ENDO MINUTES - 1ST 30 MINS LEVEL 3: Performed by: INTERNAL MEDICINE

## 2025-01-22 RX ORDER — OXYCODONE HCL 5 MG/5 ML
5 SOLUTION, ORAL ORAL
Status: DISCONTINUED | OUTPATIENT
Start: 2025-01-22 | End: 2025-01-22 | Stop reason: HOSPADM

## 2025-01-22 RX ORDER — LIDOCAINE HYDROCHLORIDE 20 MG/ML
INJECTION, SOLUTION EPIDURAL; INFILTRATION; INTRACAUDAL; PERINEURAL PRN
Status: DISCONTINUED | OUTPATIENT
Start: 2025-01-22 | End: 2025-01-23 | Stop reason: SURG

## 2025-01-22 RX ORDER — ONDANSETRON 2 MG/ML
4 INJECTION INTRAMUSCULAR; INTRAVENOUS
Status: DISCONTINUED | OUTPATIENT
Start: 2025-01-22 | End: 2025-01-22 | Stop reason: HOSPADM

## 2025-01-22 RX ORDER — DEXAMETHASONE SODIUM PHOSPHATE 4 MG/ML
INJECTION, SOLUTION INTRA-ARTICULAR; INTRALESIONAL; INTRAMUSCULAR; INTRAVENOUS; SOFT TISSUE PRN
Status: DISCONTINUED | OUTPATIENT
Start: 2025-01-22 | End: 2025-01-23 | Stop reason: SURG

## 2025-01-22 RX ORDER — LIDOCAINE HYDROCHLORIDE 40 MG/ML
SOLUTION TOPICAL PRN
Status: DISCONTINUED | OUTPATIENT
Start: 2025-01-22 | End: 2025-01-23 | Stop reason: SURG

## 2025-01-22 RX ORDER — MEPERIDINE HYDROCHLORIDE 25 MG/ML
12.5 INJECTION INTRAMUSCULAR; INTRAVENOUS; SUBCUTANEOUS
Status: DISCONTINUED | OUTPATIENT
Start: 2025-01-22 | End: 2025-01-22 | Stop reason: HOSPADM

## 2025-01-22 RX ORDER — HYDROMORPHONE HYDROCHLORIDE 1 MG/ML
0.2 INJECTION, SOLUTION INTRAMUSCULAR; INTRAVENOUS; SUBCUTANEOUS
Status: DISCONTINUED | OUTPATIENT
Start: 2025-01-22 | End: 2025-01-22 | Stop reason: HOSPADM

## 2025-01-22 RX ORDER — PANTOPRAZOLE SODIUM 40 MG/10ML
40 INJECTION, POWDER, LYOPHILIZED, FOR SOLUTION INTRAVENOUS 2 TIMES DAILY
Status: DISCONTINUED | OUTPATIENT
Start: 2025-01-22 | End: 2025-01-25

## 2025-01-22 RX ORDER — MIDAZOLAM HYDROCHLORIDE 1 MG/ML
1 INJECTION INTRAMUSCULAR; INTRAVENOUS
Status: DISCONTINUED | OUTPATIENT
Start: 2025-01-22 | End: 2025-01-22 | Stop reason: HOSPADM

## 2025-01-22 RX ORDER — INSULIN LISPRO 100 [IU]/ML
2-10 INJECTION, SOLUTION INTRAVENOUS; SUBCUTANEOUS EVERY 6 HOURS
Status: DISCONTINUED | OUTPATIENT
Start: 2025-01-22 | End: 2025-01-22

## 2025-01-22 RX ORDER — INSULIN LISPRO 100 [IU]/ML
2-10 INJECTION, SOLUTION INTRAVENOUS; SUBCUTANEOUS EVERY 6 HOURS
Status: DISCONTINUED | OUTPATIENT
Start: 2025-01-22 | End: 2025-01-23

## 2025-01-22 RX ORDER — HALOPERIDOL 5 MG/ML
1 INJECTION INTRAMUSCULAR
Status: DISCONTINUED | OUTPATIENT
Start: 2025-01-22 | End: 2025-01-22 | Stop reason: HOSPADM

## 2025-01-22 RX ORDER — GABAPENTIN 300 MG/1
900 CAPSULE ORAL 3 TIMES DAILY
Status: DISCONTINUED | OUTPATIENT
Start: 2025-01-22 | End: 2025-01-24

## 2025-01-22 RX ORDER — DEXTROSE MONOHYDRATE 25 G/50ML
25 INJECTION, SOLUTION INTRAVENOUS
Status: DISCONTINUED | OUTPATIENT
Start: 2025-01-22 | End: 2025-01-22

## 2025-01-22 RX ORDER — DEXTROSE MONOHYDRATE 25 G/50ML
25 INJECTION, SOLUTION INTRAVENOUS
Status: DISCONTINUED | OUTPATIENT
Start: 2025-01-22 | End: 2025-01-23

## 2025-01-22 RX ORDER — OXYCODONE HCL 5 MG/5 ML
10 SOLUTION, ORAL ORAL
Status: DISCONTINUED | OUTPATIENT
Start: 2025-01-22 | End: 2025-01-22 | Stop reason: HOSPADM

## 2025-01-22 RX ORDER — IPRATROPIUM BROMIDE AND ALBUTEROL SULFATE 2.5; .5 MG/3ML; MG/3ML
3 SOLUTION RESPIRATORY (INHALATION)
Status: DISCONTINUED | OUTPATIENT
Start: 2025-01-22 | End: 2025-01-22 | Stop reason: HOSPADM

## 2025-01-22 RX ORDER — ONDANSETRON 2 MG/ML
INJECTION INTRAMUSCULAR; INTRAVENOUS PRN
Status: DISCONTINUED | OUTPATIENT
Start: 2025-01-22 | End: 2025-01-23 | Stop reason: SURG

## 2025-01-22 RX ORDER — DIPHENHYDRAMINE HYDROCHLORIDE 50 MG/ML
12.5 INJECTION INTRAMUSCULAR; INTRAVENOUS
Status: DISCONTINUED | OUTPATIENT
Start: 2025-01-22 | End: 2025-01-22 | Stop reason: HOSPADM

## 2025-01-22 RX ORDER — HYDROMORPHONE HYDROCHLORIDE 1 MG/ML
0.4 INJECTION, SOLUTION INTRAMUSCULAR; INTRAVENOUS; SUBCUTANEOUS
Status: DISCONTINUED | OUTPATIENT
Start: 2025-01-22 | End: 2025-01-22 | Stop reason: HOSPADM

## 2025-01-22 RX ORDER — ROCURONIUM BROMIDE 10 MG/ML
INJECTION, SOLUTION INTRAVENOUS PRN
Status: DISCONTINUED | OUTPATIENT
Start: 2025-01-22 | End: 2025-01-23 | Stop reason: HOSPADM

## 2025-01-22 RX ORDER — SODIUM CHLORIDE, SODIUM LACTATE, POTASSIUM CHLORIDE, CALCIUM CHLORIDE 600; 310; 30; 20 MG/100ML; MG/100ML; MG/100ML; MG/100ML
INJECTION, SOLUTION INTRAVENOUS CONTINUOUS
Status: ACTIVE | OUTPATIENT
Start: 2025-01-22 | End: 2025-01-22

## 2025-01-22 RX ORDER — HYDROMORPHONE HYDROCHLORIDE 1 MG/ML
1 INJECTION, SOLUTION INTRAMUSCULAR; INTRAVENOUS; SUBCUTANEOUS
Status: DISCONTINUED | OUTPATIENT
Start: 2025-01-22 | End: 2025-01-22 | Stop reason: HOSPADM

## 2025-01-22 RX ORDER — LORAZEPAM 2 MG/ML
1 INJECTION INTRAMUSCULAR ONCE
Status: COMPLETED | OUTPATIENT
Start: 2025-01-22 | End: 2025-01-22

## 2025-01-22 RX ADMIN — LIDOCAINE HYDROCHLORIDE 4 ML: 40 SOLUTION TOPICAL at 12:39

## 2025-01-22 RX ADMIN — CHLORDIAZEPOXIDE HYDROCHLORIDE 25 MG: 25 CAPSULE ORAL at 15:47

## 2025-01-22 RX ADMIN — CHLORDIAZEPOXIDE HYDROCHLORIDE 50 MG: 25 CAPSULE ORAL at 02:35

## 2025-01-22 RX ADMIN — ROCURONIUM BROMIDE 50 MG: 50 INJECTION, SOLUTION INTRAVENOUS at 12:39

## 2025-01-22 RX ADMIN — LORAZEPAM 1.5 MG: 2 INJECTION INTRAMUSCULAR; INTRAVENOUS at 16:19

## 2025-01-22 RX ADMIN — PANTOPRAZOLE SODIUM 40 MG: 40 INJECTION, POWDER, FOR SOLUTION INTRAVENOUS at 17:44

## 2025-01-22 RX ADMIN — GABAPENTIN 900 MG: 300 CAPSULE ORAL at 21:18

## 2025-01-22 RX ADMIN — SODIUM CHLORIDE, POTASSIUM CHLORIDE, SODIUM LACTATE AND CALCIUM CHLORIDE: 600; 310; 30; 20 INJECTION, SOLUTION INTRAVENOUS at 12:32

## 2025-01-22 RX ADMIN — DEXAMETHASONE SODIUM PHOSPHATE 8 MG: 4 INJECTION INTRA-ARTICULAR; INTRALESIONAL; INTRAMUSCULAR; INTRAVENOUS; SOFT TISSUE at 12:42

## 2025-01-22 RX ADMIN — LORAZEPAM 0.5 MG: 2 INJECTION INTRAMUSCULAR; INTRAVENOUS at 02:29

## 2025-01-22 RX ADMIN — LIDOCAINE HYDROCHLORIDE 50 MG: 20 INJECTION, SOLUTION EPIDURAL; INFILTRATION; INTRACAUDAL; PERINEURAL at 12:39

## 2025-01-22 RX ADMIN — PROCHLORPERAZINE EDISYLATE 10 MG: 5 INJECTION INTRAMUSCULAR; INTRAVENOUS at 02:40

## 2025-01-22 RX ADMIN — CHLORDIAZEPOXIDE HYDROCHLORIDE 25 MG: 25 CAPSULE ORAL at 21:18

## 2025-01-22 RX ADMIN — LORAZEPAM 1 MG: 2 INJECTION INTRAMUSCULAR; INTRAVENOUS at 21:04

## 2025-01-22 RX ADMIN — LORAZEPAM 1.5 MG: 2 INJECTION INTRAMUSCULAR; INTRAVENOUS at 09:34

## 2025-01-22 RX ADMIN — INSULIN LISPRO 6 UNITS: 100 INJECTION, SOLUTION INTRAVENOUS; SUBCUTANEOUS at 04:47

## 2025-01-22 RX ADMIN — LORAZEPAM 2 MG: 2 INJECTION INTRAMUSCULAR; INTRAVENOUS at 23:26

## 2025-01-22 RX ADMIN — LORAZEPAM 1.5 MG: 2 INJECTION INTRAMUSCULAR; INTRAVENOUS at 14:33

## 2025-01-22 RX ADMIN — LORAZEPAM 1 MG: 2 INJECTION INTRAMUSCULAR; INTRAVENOUS at 10:56

## 2025-01-22 RX ADMIN — INSULIN LISPRO 8 UNITS: 100 INJECTION, SOLUTION INTRAVENOUS; SUBCUTANEOUS at 11:42

## 2025-01-22 RX ADMIN — LORAZEPAM 1.5 MG: 2 INJECTION INTRAMUSCULAR; INTRAVENOUS at 22:05

## 2025-01-22 RX ADMIN — SUGAMMADEX 200 MG: 100 INJECTION, SOLUTION INTRAVENOUS at 12:48

## 2025-01-22 RX ADMIN — LORAZEPAM 2 MG: 1 TABLET ORAL at 18:12

## 2025-01-22 RX ADMIN — INSULIN LISPRO 4 UNITS: 100 INJECTION, SOLUTION INTRAVENOUS; SUBCUTANEOUS at 16:31

## 2025-01-22 RX ADMIN — LORAZEPAM 1 MG: 2 INJECTION INTRAMUSCULAR; INTRAVENOUS at 12:20

## 2025-01-22 RX ADMIN — ONDANSETRON 4 MG: 2 INJECTION INTRAMUSCULAR; INTRAVENOUS at 12:48

## 2025-01-22 RX ADMIN — PROPOFOL 150 MG: 10 INJECTION, EMULSION INTRAVENOUS at 12:39

## 2025-01-22 RX ADMIN — LORAZEPAM 1 MG: 2 INJECTION INTRAMUSCULAR; INTRAVENOUS at 04:57

## 2025-01-22 RX ADMIN — INSULIN LISPRO 10 UNITS: 100 INJECTION, SOLUTION INTRAVENOUS; SUBCUTANEOUS at 22:29

## 2025-01-22 ASSESSMENT — LIFESTYLE VARIABLES
VISUAL DISTURBANCES: NOT PRESENT
AUDITORY DISTURBANCES: NOT PRESENT
ANXIETY: MILDLY ANXIOUS
TREMOR: *
AGITATION: PACES BACK AND FORTH DURING MOST OF THE INTERVIEW OR CONSTANTLY THRASHES ABOUT.
VISUAL DISTURBANCES: NOT PRESENT
ANXIETY: MODERATELY ANXIOUS OR GUARDED, SO ANXIETY IS INFERRED
ORIENTATION AND CLOUDING OF SENSORIUM: ORIENTED AND CAN DO SERIAL ADDITIONS
ORIENTATION AND CLOUDING OF SENSORIUM: ORIENTED AND CAN DO SERIAL ADDITIONS
TREMOR: *
AUDITORY DISTURBANCES: NOT PRESENT
HEADACHE, FULLNESS IN HEAD: NOT PRESENT
TREMOR: MODERATE TREMOR WITH ARMS EXTENDED
TOTAL SCORE: 25
VISUAL DISTURBANCES: NOT PRESENT
ORIENTATION AND CLOUDING OF SENSORIUM: ORIENTED AND CAN DO SERIAL ADDITIONS
PAROXYSMAL SWEATS: *
NAUSEA AND VOMITING: *
HEADACHE, FULLNESS IN HEAD: NOT PRESENT
TREMOR: *
TOTAL SCORE: MILD ITCHING, PINS AND NEEDLES SENSATION, BURNING OR NUMBNESS
VISUAL DISTURBANCES: NOT PRESENT
TOTAL SCORE: MILD ITCHING, PINS AND NEEDLES SENSATION, BURNING OR NUMBNESS
TREMOR: *
NAUSEA AND VOMITING: NO NAUSEA AND NO VOMITING
TOTAL SCORE: 8
VISUAL DISTURBANCES: NOT PRESENT
VISUAL DISTURBANCES: NOT PRESENT
PAROXYSMAL SWEATS: NO SWEAT VISIBLE
NAUSEA AND VOMITING: *
TREMOR: NO TREMOR
ANXIETY: *
NAUSEA AND VOMITING: NO NAUSEA AND NO VOMITING
HEADACHE, FULLNESS IN HEAD: NOT PRESENT
HEADACHE, FULLNESS IN HEAD: NOT PRESENT
ANXIETY: MODERATELY ANXIOUS OR GUARDED, SO ANXIETY IS INFERRED
PAROXYSMAL SWEATS: NO SWEAT VISIBLE
AUDITORY DISTURBANCES: NOT PRESENT
NAUSEA AND VOMITING: *
NAUSEA AND VOMITING: *
ANXIETY: MODERATELY ANXIOUS OR GUARDED, SO ANXIETY IS INFERRED
TREMOR: NO TREMOR
HEADACHE, FULLNESS IN HEAD: NOT PRESENT
TOTAL SCORE: 12
AUDITORY DISTURBANCES: NOT PRESENT
ANXIETY: *
AUDITORY DISTURBANCES: NOT PRESENT
HEADACHE, FULLNESS IN HEAD: MODERATE
ANXIETY: MODERATELY ANXIOUS OR GUARDED, SO ANXIETY IS INFERRED
AGITATION: MODERATELY FIDGETY AND RESTLESS
ORIENTATION AND CLOUDING OF SENSORIUM: ORIENTED AND CAN DO SERIAL ADDITIONS
TOTAL SCORE: 11
AGITATION: SOMEWHAT MORE THAN NORMAL ACTIVITY
NAUSEA AND VOMITING: *
AUDITORY DISTURBANCES: NOT PRESENT
AGITATION: MODERATELY FIDGETY AND RESTLESS
AUDITORY DISTURBANCES: NOT PRESENT
VISUAL DISTURBANCES: NOT PRESENT
PAROXYSMAL SWEATS: *
HEADACHE, FULLNESS IN HEAD: NOT PRESENT
VISUAL DISTURBANCES: NOT PRESENT
PAROXYSMAL SWEATS: NO SWEAT VISIBLE
AGITATION: MODERATELY FIDGETY AND RESTLESS
HEADACHE, FULLNESS IN HEAD: NOT PRESENT
AGITATION: SOMEWHAT MORE THAN NORMAL ACTIVITY
TOTAL SCORE: 4
ANXIETY: NO ANXIETY (AT EASE)
TOTAL SCORE: 11
AGITATION: NORMAL ACTIVITY
PAROXYSMAL SWEATS: NO SWEAT VISIBLE
AUDITORY DISTURBANCES: NOT PRESENT
TOTAL SCORE: MILD ITCHING, PINS AND NEEDLES SENSATION, BURNING OR NUMBNESS
ANXIETY: MODERATELY ANXIOUS OR GUARDED, SO ANXIETY IS INFERRED
AGITATION: MODERATELY FIDGETY AND RESTLESS
HEADACHE, FULLNESS IN HEAD: NOT PRESENT
TOTAL SCORE: VERY MILD ITCHING, PINS AND NEEDLES SENSATION, BURNING OR NUMBNESS
TOTAL SCORE: 14
TOTAL SCORE: 6
PAROXYSMAL SWEATS: *
TOTAL SCORE: 16
AUDITORY DISTURBANCES: NOT PRESENT
ORIENTATION AND CLOUDING OF SENSORIUM: ORIENTED AND CAN DO SERIAL ADDITIONS
ORIENTATION AND CLOUDING OF SENSORIUM: ORIENTED AND CAN DO SERIAL ADDITIONS
TOTAL SCORE: MODERATE ITCHING, PINS AND NEEDLES SENSATION, BURNING OR NUMBNESS
AUDITORY DISTURBANCES: NOT PRESENT
ORIENTATION AND CLOUDING OF SENSORIUM: ORIENTED AND CAN DO SERIAL ADDITIONS
HEADACHE, FULLNESS IN HEAD: NOT PRESENT
HEADACHE, FULLNESS IN HEAD: NOT PRESENT
PAROXYSMAL SWEATS: BARELY PERCEPTIBLE SWEATING. PALMS MOIST
NAUSEA AND VOMITING: NO NAUSEA AND NO VOMITING
AGITATION: *
VISUAL DISTURBANCES: NOT PRESENT
TREMOR: NO TREMOR
AUDITORY DISTURBANCES: NOT PRESENT
ANXIETY: MODERATELY ANXIOUS OR GUARDED, SO ANXIETY IS INFERRED
TREMOR: MODERATE TREMOR WITH ARMS EXTENDED
ORIENTATION AND CLOUDING OF SENSORIUM: ORIENTED AND CAN DO SERIAL ADDITIONS
PAROXYSMAL SWEATS: NO SWEAT VISIBLE
PAROXYSMAL SWEATS: NO SWEAT VISIBLE
ANXIETY: MODERATELY ANXIOUS OR GUARDED, SO ANXIETY IS INFERRED
TOTAL SCORE: 15
VISUAL DISTURBANCES: NOT PRESENT
TOTAL SCORE: 15
VISUAL DISTURBANCES: VERY MILD SENSITIVITY
NAUSEA AND VOMITING: NO NAUSEA AND NO VOMITING
AGITATION: MODERATELY FIDGETY AND RESTLESS
AUDITORY DISTURBANCES: NOT PRESENT
TREMOR: NO TREMOR
ORIENTATION AND CLOUDING OF SENSORIUM: ORIENTED AND CAN DO SERIAL ADDITIONS
HEADACHE, FULLNESS IN HEAD: MODERATE
ANXIETY: MODERATELY ANXIOUS OR GUARDED, SO ANXIETY IS INFERRED
NAUSEA AND VOMITING: NO NAUSEA AND NO VOMITING
AGITATION: NORMAL ACTIVITY
TOTAL SCORE: MODERATE ITCHING, PINS AND NEEDLES SENSATION, BURNING OR NUMBNESS
VISUAL DISTURBANCES: NOT PRESENT
NAUSEA AND VOMITING: NO NAUSEA AND NO VOMITING
AGITATION: MODERATELY FIDGETY AND RESTLESS
TREMOR: MODERATE TREMOR WITH ARMS EXTENDED
ORIENTATION AND CLOUDING OF SENSORIUM: ORIENTED AND CAN DO SERIAL ADDITIONS
ORIENTATION AND CLOUDING OF SENSORIUM: CANNOT DO SERIAL ADDITIONS OR IS UNCERTAIN ABOUT DATE
NAUSEA AND VOMITING: MILD NAUSEA WITH NO VOMITING
ORIENTATION AND CLOUDING OF SENSORIUM: ORIENTED AND CAN DO SERIAL ADDITIONS
PAROXYSMAL SWEATS: BARELY PERCEPTIBLE SWEATING. PALMS MOIST
PAROXYSMAL SWEATS: NO SWEAT VISIBLE
TOTAL SCORE: 17
TREMOR: *

## 2025-01-22 ASSESSMENT — PAIN DESCRIPTION - PAIN TYPE
TYPE: SURGICAL PAIN
TYPE: ACUTE PAIN
TYPE: SURGICAL PAIN
TYPE: SURGICAL PAIN

## 2025-01-22 ASSESSMENT — ENCOUNTER SYMPTOMS
HEARTBURN: 0
CHILLS: 0
HEADACHES: 0
VOMITING: 0
SPEECH CHANGE: 0
CONSTIPATION: 0
MYALGIAS: 0
WEAKNESS: 0
SHORTNESS OF BREATH: 0
PHOTOPHOBIA: 0
CLAUDICATION: 0
DIARRHEA: 0
NERVOUS/ANXIOUS: 1
SENSORY CHANGE: 0
INSOMNIA: 0
DEPRESSION: 0
COUGH: 0
ABDOMINAL PAIN: 0
DIZZINESS: 0
BLURRED VISION: 0
NAUSEA: 1
FEVER: 0

## 2025-01-22 NOTE — HOSPITAL COURSE
Patient is a 25-year-old male with history of alcoholism came in with bloody stools and intractable nausea vomiting to [place].  Patient drinking a pint of Anand Parham a day with significant marijuana use as well.  Patient is tolerating octreotide drip as well as IV fluids but still is markedly tachycardic.  He got up and wanted to  place and then felt lightheaded and sat back down.  He continues on CIWA protocol as well as Librium.  He went to the endoscopy suite with Dr. Glover and found to have no evidence of varices thus octreotide will be stopped.  More than likely he was having bleeding from the significant esophagitis and portal hypertensive gastropathy.  IV Protonix recommended twice daily and reinitiation of diet starting at clear liquids and advance as tolerated.

## 2025-01-22 NOTE — PROGRESS NOTES
Gastroenterology Progress Note     Author: Marc Glover D.O.   Date & Time Created: 2025 12:18 PM    Chief Complaint:  Gi bleed     Interval History:  N/V, hematemesis. EtOH abuse, MJN use.     Review of Systems:  ROS    Physical Exam:  Physical Exam    Labs:  Recent Labs     25  1313   [id number] NA         Recent Labs     25  1313 25  0702   SODIUM 141 139   POTASSIUM 3.9 4.0   CHLORIDE 102 98   CO2 17* 20   BUN 7* 7*   CREATININE 0.76 0.89   MAGNESIUM 1.2* 1.8   PHOSPHORUS 1.9* 2.1*   CALCIUM 9.4 9.0     Recent Labs     25  1313 25  0702   ALTSGPT 18  --    ASTSGOT 23  --    ALKPHOSPHAT 76  --    TBILIRUBIN 0.7  --    LIPASE 5*  --    GLUCOSE 126* 259*     Recent Labs     25  1313 25  21425  0702   RBC 5.24 5.28 5.14   HEMOGLOBIN 16.5 16.7 16.2   HEMATOCRIT 48.9 48.4 47.5   PLATELETCT 239 257 244   PROTHROMBTM 14.4  --   --    APTT 26.4  --   --    INR 1.08  --   --      Recent Labs     25  1313 25  0702   WBC 14.1* 14.3* 15.8*   NEUTSPOLYS 82.90*  --   --    LYMPHOCYTES 10.20*  --   --    MONOCYTES 5.60  --   --    EOSINOPHILS 0.50  --   --    BASOPHILS 0.40  --   --    ASTSGOT 23  --   --    ALTSGPT 18  --   --    ALKPHOSPHAT 76  --   --    TBILIRUBIN 0.7  --   --      Hemodynamics:  Temp (24hrs), Av.8 °C (98.2 °F), Min:36.3 °C (97.4 °F), Max:37.4 °C (99.3 °F)  Temperature: 36.8 °C (98.2 °F)  Pulse  Av  Min: 107  Max: 132   Blood Pressure: (!) 150/96     Respiratory:    Respiration: 16, Pulse Oximetry: 98 %           Fluids:    Intake/Output Summary (Last 24 hours) at 2025 1218  Last data filed at 2025 0242  Gross per 24 hour   Intake 150 ml   Output 1675 ml   Net -1525 ml     Weight: 70.4 kg (155 lb 3.3 oz)  GI/Nutrition:  Orders Placed This Encounter   Procedures    Diet NPO Restrict to: Sips with Medications     Standing Status:   Standing     Number of Occurrences:   8     Order Specific Question:   Diet  NPO Restrict to:     Answer:   Sips with Medications [3]     Medical Decision Making, by Problem:  Active Hospital Problems    Diagnosis     *Alcohol withdrawal syndrome with complication (HCC) [F10.939]     Diabetes mellitus type 1 (HCC) [E10.9]     Metabolic acidosis [E87.20]     Leukocytosis [D72.829]     Hypomagnesemia [E83.42]     Hypophosphatemia [E83.39]     GI bleed [K92.2]     Marijuana user [F12.90]        Plan:  See full dictated consult note  See procedure note - EGD   Keep NPO  IV PPI, octreotide   Will make further recommendations based on results of EGD     Quality-Core Measures

## 2025-01-22 NOTE — PROGRESS NOTES
Telemetry summary    Rhythm: ST  Rate: 101-127  Ectopy: 0  Measurements: 0.14/0.08/0.32    Normal Values  Rhythm: SR  HR Range:   Measurement: 0.12-0.2/0.06-0.10/0.30-0.52

## 2025-01-22 NOTE — PROGRESS NOTES
Telemetry Shift Summary     Rhythm: ST  Rate: 101  Measurements: .14/.08/.30  Ectopy (reported by Monitor Tech):       Normal Values  Rhythm: Sinus  HR:   Measurements: 0.12-0.20/0.06-0.10/0.30-0.52

## 2025-01-22 NOTE — PROGRESS NOTES
Patient notified RN that blood sugar was feeling high, blood sugar check on glucometer 255. Notified Dr. Reyes of this and that the patient was NPO. Per Dr. Reyes, okay to give 0700 insulin lispro dose early.

## 2025-01-22 NOTE — DISCHARGE PLANNING
Complex Case Management    Order received for Complex Case Management. Patient's chart has been reviewed.     Complex case management following? No    No: Discharge planning recommendations are to have floor case management initiate discharge planning. Consult will be cancelled. Please re-consult as needed.    NOTE: There may be cases that are NOT assigned to a CCM but will be followed for progression of care by leaders of the Complex Discharge Committee.

## 2025-01-22 NOTE — ANESTHESIA PROCEDURE NOTES
Airway    Date/Time: 1/22/2025 12:39 PM    Performed by: Fabien Grullon III, M.D.  Authorized by: Fabien Grullon III, M.D.    Location:  OR  Urgency:  Elective  Difficult Airway: No    Indications for Airway Management:  Anesthesia      Spontaneous Ventilation: absent    Sedation Level:  Deep  Preoxygenated: Yes    Patient Position:  Sniffing  Final Airway Type:  Endotracheal airway  Final Endotracheal Airway:  ETT  Cuffed: Yes    Technique Used for Successful ETT Placement:  Direct laryngoscopy    Insertion Site:  Oral  Blade Type:  Dorman  Laryngoscope Blade/Videolaryngoscope Blade Size:  3  ETT Size (mm):  7.5  Measured from:  Lips  ETT to Lips (cm):  22  Placement Verified by: auscultation and capnometry    Cormack-Lehane Classification:  Grade III - view of epiglottis only  Number of Attempts at Approach:  1

## 2025-01-22 NOTE — CARE PLAN
The patient is Watcher - Medium risk of patient condition declining or worsening    Shift Goals  Clinical Goals: CIWA, emesis  Patient Goals: safety    Progress made toward(s) clinical / shift goals:    Problem: Knowledge Deficit - Standard  Goal: Patient and family/care givers will demonstrate understanding of plan of care, disease process/condition, diagnostic tests and medications  Outcome: Progressing  Note: Pt updated on POC, agreeable. Monitoring CIWA and emesis. Scope in the AM. All questions and concerns addressed at this time.      Problem: Optimal Care for Alcohol Withdrawal  Goal: Optimal Care for the alcohol withdrawal patient  Outcome: Progressing     Problem: Seizure Precautions  Goal: Implementation of seizure precautions  Outcome: Progressing  Note: CIWA precautions implemented. Bedrails padded, suction readily available.      Problem: Fall Risk  Goal: Patient will remain free from falls  Outcome: Progressing  Note: Pt is a high fall risk, fall precautions in place. Pt educated on use of call light for any needed assistance.

## 2025-01-22 NOTE — OR NURSING
1215: Patient allergies and NPO status verified, home medication reconciliation completed and belongings secured. Patient verbalizes understanding of pain scale, expected course of stay and plan of care. Surgical site verified with patient. IV access established.     Pt actively vomiting, shaky and impulsive.  Dr. Grullon notified and given ativan as ordered.

## 2025-01-22 NOTE — CARE PLAN
The patient is Stable - Low risk of patient condition declining or worsening    Shift Goals  Clinical Goals: Monitor labs and vitals; CIWA  Patient Goals: rest and comfort    Progress made toward(s) clinical / shift goals: Patient had a new IV placed, 3 RNs attempted to place IV. Patient had CIWA scoring completed. Patient was consulted by pharmacy at bedside.      Problem: Knowledge Deficit - Standard  Goal: Patient and family/care givers will demonstrate understanding of plan of care, disease process/condition, diagnostic tests and medications  Outcome: Progressing  Note: Patient verbalizes understanding of plan of care and barriers to discharge. Patient asks appropriate questions about plan of care.       Problem: Fall Risk  Goal: Patient will remain free from falls  Outcome: Progressing  Note: Fall precautions are in place. Patient uses call light appropriately.         Patient is not progressing towards the following goals:

## 2025-01-22 NOTE — PROGRESS NOTES
Patient refused to answer admit questions due to throwing up and not feeling well. Patient answered CIWA assessment questions and MAR was followed, patient refused to answer questions due to fatigue and post administration of medications.

## 2025-01-22 NOTE — OR NURSING
1253 Pt arrived to PACU from OR. VSS. 6L Simple mask in place. Pt is resting comfortably. Non-labored respirations. Pt is spontaneous breathing. No symptoms or complaints regarding pain or nausea at this time.      1309 Dr Glover BS, No drinking for awhile, pt had emesis preop. Will need medications for esophagitis.     1310 Pt is asking to call his mom and drink water. Educated him nothing right now, belly rest.     1315 called Mom Zuleika [phone] for updates. Pt is tearful after talking to mom.    1320 Pt meets criteria for room transfer. Called Yanelis for report. She will call me back.     1330 Pt dropped to 88% on RA. Placed 2L NC.    1337 Report to FLORENCIO Hilario. Notified transport pt is ready.     1345 Pt transferred back to room.

## 2025-01-22 NOTE — OP REPORT
DATE OF SERVICE:  01/22/2025     PROCEDURE:  Esophagogastroduodenoscopy.     PREOPERATIVE DIAGNOSIS:  GI bleed.     POSTOPERATIVE DIAGNOSES:  LA grade D esophagitis and portal hypertensive   gastropathy.     ANESTHESIA:  General anesthesia per anesthesiologist.     DESCRIPTION OF PROCEDURE:  After informed consent and appropriate sedation,   the scope was advanced through the oropharynx into the esophagus, through to   the second portion of duodenum.  Mucosa was then examined carefully as the   scope was gently withdrawn.  The duodenum was unremarkable as was the gastric   antrum and body.  There was a large amount of liquid in the stomach that was   suctioned.  There were no active bleeding seen.  There was moderate portal   hypertensive gastropathy in the proximal body as well as the cardia and   fundus.  The stomach was decompressed.  The scope was withdrawn back into the   esophagus.  There was LA grade D ulcerative esophagitis circumferentially in   the distal third of the esophagus.  The remainder of the esophagus was   unremarkable.  There were no varices seen in the esophagus or the stomach.    The bowel was decompressed.  The scope was withdrawn.  There were no immediate   postop complications.     RECOMMENDATIONS:  1.  Discontinue octreotide drip.  2.  IV PPI b.i.d.  3.  Clear liquid diet, advance as tolerated from a GI standpoint.  However,   the patient is quite ill and may not be able to tolerate diet yet, so   recommend keeping the patient n.p.o. until the patient no longer has nausea,   vomiting.  4.  Recommend cessation of alcohol use.  5.  Follow up with nurse practitioner as an outpatient at [facility] if the patient is willing to come up to [place] for visits.  6.  We will sign off for now.  Please call with any questions or concerns.        ______________________________  DO FRANKI Stewart    DD:  01/22/2025 12:51  DT:  01/22/2025 13:34    Job#:  [id number]

## 2025-01-22 NOTE — DISCHARGE PLANNING
Met with pt who said that he lives with his parents in [place]. He reports being independent with ADLs and IADLs.     PCP ( pt said he has one but does not remember name)  Possibly need transportation to home but pt has MTM.     Care Transition Team Assessment    Information Source  Orientation Level: Oriented X4  Information Given By: Patient  Who is responsible for making decisions for patient? : Patient    Readmission Evaluation  Is this a readmission?: No    Elopement Risk  Legal Hold: No  Ambulatory or Self Mobile in Wheelchair: Yes  Disoriented: No  Elopement Risk: Not at Risk for Elopement    Interdisciplinary Discharge Planning  Does Admitting Nurse Feel This Could be a Complex Discharge?: No  Primary Care Physician: He has a PCP but cant remember name  Lives with - Patient's Self Care Capacity: Parents  Patient or legal guardian wants to designate a caregiver: No  Support Systems: Parent  Do You Take your Prescribed Medications Regularly: Yes  Able to Return to Previous ADL's: Yes  Mobility Issues: No  Prior Services: Home-Independent  Patient Prefers to be Discharged to:: Home  Assistance Needed: Yes    Discharge Preparedness  What is your plan after discharge?: Home with help  What are your discharge supports?: Parent  Prior Functional Level: Ambulatory, Drives Self, Independent with Activities of Daily Living, Independent with Medication Management  Difficulity with ADLs: None  Difficulity with IADLs: None    Functional Assesment  Prior Functional Level: Ambulatory, Drives Self, Independent with Activities of Daily Living, Independent with Medication Management    Finances  Financial Barriers to Discharge: No  Prescription Coverage: Yes    Vision / Hearing Impairment  Vision Impairment : No  Hearing Impairment : No    Values / Beliefs / Concerns  Values / Beliefs Concerns : No    Advance Directive  Advance Directive?: None    Domestic Abuse  Have you ever been the victim of abuse or violence?: No          Discharge Risks or Barriers  Discharge risks or barriers?: No  Patient risk factors: Vulnerable adult    Anticipated Discharge Information  Discharge Disposition: Discharged to home/self care (01)

## 2025-01-22 NOTE — PROGRESS NOTES
4 Eyes Skin Assessment Completed by FLORENCIO Hopper and FLORENCIO Chu.    Head WDL  Ears WDL  Nose WDL  Mouth Scab/cut on lip  Neck WDL  Breast/Chest Redness, Abrasion, Scab, and Scar  Shoulder Blades Redness, scabs, bruising  Spine Redness and Bruising, scabs  (R) Arm/Elbow/Hand Redness, Scab, and Scar, bruising  (L) Arm/Elbow/Hand Redness, Bruising, Scab, and Scar  Abdomen Redness, Scar, and Scab  Groin WDL  Scrotum/Coccyx/Buttocks WDL  (R) Leg Redness, Scar, Scab, Bruising, and Abrasion  (L) Leg Redness, Scar, Scab, Bruising, and Abrasion  (R) Heel/Foot/Toe WDL  (L) Heel/Foot/Toe WDL          Devices In Places Tele Box and Pulse Ox      Interventions In Place Pillows    Possible Skin Injury No    Pictures Uploaded Into Epic N/A  Wound Consult Placed N/A  RN Wound Prevention Protocol Ordered No

## 2025-01-22 NOTE — ANESTHESIA PREPROCEDURE EVALUATION
Case: [id number] Date/Time: 01/22/25 1225    Procedure: ENDOSCOPY    Location:  ENDOSCOPIC ULTRASOUND ROOM / SURGERY [facility]    Surgeons: Marc Glover D.O.            Relevant Problems   ENDO   (positive) Diabetes mellitus type 1 (HCC)       Physical Exam    Airway   Mallampati: II  TM distance: >3 FB  Neck ROM: full       Cardiovascular - normal exam  Rhythm: regular  Rate: normal  (-) murmur     Dental - normal exam           Pulmonary - normal exam  Breath sounds clear to auscultation     Abdominal    Neurological - normal exam         Other findings: Alcohol abuse, CIWA protocol, acute GI bleeding, intractable N/V              Anesthesia Plan    ASA 3- EMERGENT       Plan - general       Airway plan will be ETT          Induction: intravenous    Postoperative Plan: Postoperative administration of opioids is intended.    Pertinent diagnostic labs and testing reviewed    Informed Consent:    Anesthetic plan and risks discussed with patient.    Use of blood products discussed with: patient whom consented to blood products.

## 2025-01-22 NOTE — PROGRESS NOTES
Hospital Medicine Daily Progress Note    Date of Service  1/22/2025    Chief Complaint  Karlie Nathan is a 25 y.o. male admitted 1/21/2025 with a bleed, tachycardia, nausea vomiting    Hospital Course  Patient is a 25-year-old male with history of alcoholism came in with bloody stools and intractable nausea vomiting to [place].  Patient drinking a pint of Anand Parham a day with significant marijuana use as well.  Patient is tolerating octreotide drip as well as IV fluids but still is markedly tachycardic.  He got up and wanted to  place and then felt lightheaded and sat back down.  He continues on CIWA protocol as well as Librium.  He went to the endoscopy suite with Dr. Glover and found to have no evidence of varices thus octreotide will be stopped.  More than likely he was having bleeding from the significant esophagitis and portal hypertensive gastropathy.  IV Protonix recommended twice daily and reinitiation of diet starting at clear liquids and advance as tolerated.    Interval Problem Update  1/22 patient seen this morning prior to endoscopy, he states he was having back pain and wanted to get up and with assistance he stood up and started feeling more tachycardic lightheaded and then sat back down in the bed saying he felt woozy.  Patient is going through significant alcohol withdrawal and is tolerating Librium in addition to CIWA protocol.  If he should show evidence of worsening he may need an upgrade to the ICU for Precedex drip.  No evidence of varices on EGD, and octreotide stopped, IV PPI initiated secondary to significant esophagitis.    I have discussed this patient's plan of care and discharge plan at IDT rounds today with Case Management, Nursing, Nursing leadership, and other members of the IDT team.    Consultants/Specialty  GI    Code Status  Full Code    Disposition  The patient is not medically cleared for discharge to home or a post-acute facility.  Anticipate discharge to: home  with close outpatient follow-up    I have placed the appropriate orders for post-discharge needs.    Review of Systems  Review of Systems   Constitutional:  Negative for chills and fever.   HENT:  Negative for congestion.    Eyes:  Negative for blurred vision and photophobia.   Respiratory:  Negative for cough and shortness of breath.    Cardiovascular:  Negative for chest pain, claudication and leg swelling.   Gastrointestinal:  Positive for nausea. Negative for abdominal pain, constipation, diarrhea, heartburn and vomiting.   Genitourinary:  Negative for dysuria and hematuria.   Musculoskeletal:  Negative for joint pain and myalgias.   Skin:  Negative for itching and rash.   Neurological:  Negative for dizziness, sensory change, speech change, weakness and headaches.   Psychiatric/Behavioral:  Negative for depression. The patient is nervous/anxious. The patient does not have insomnia.         Physical Exam  Temp:  [36.5 °C (97.7 °F)-37.4 °C (99.3 °F)] 36.5 °C (97.7 °F)  Pulse:  [103-132] 103  Resp:  [11-26] 18  BP: (107-187)/() 152/75  SpO2:  [92 %-98 %] 96 %    Physical Exam  Vitals and nursing note reviewed.   Constitutional:       General: He is not in acute distress.     Appearance: Normal appearance. He is ill-appearing and diaphoretic.   HENT:      Head: Normocephalic and atraumatic.   Eyes:      General: No scleral icterus.     Extraocular Movements: Extraocular movements intact.   Cardiovascular:      Rate and Rhythm: Regular rhythm. Tachycardia present.      Pulses: Normal pulses.      Heart sounds: Normal heart sounds. No murmur heard.  Pulmonary:      Effort: Pulmonary effort is normal. No respiratory distress.      Breath sounds: Normal breath sounds. No wheezing, rhonchi or rales.   Abdominal:      General: Abdomen is flat. Bowel sounds are normal. There is no distension.      Palpations: Abdomen is soft.      Tenderness: There is no rebound.   Musculoskeletal:         General: No swelling or  tenderness.      Cervical back: Normal range of motion and neck supple.   Lymphadenopathy:      Cervical: No cervical adenopathy.   Skin:     Coloration: Skin is not jaundiced.      Findings: No erythema.   Neurological:      General: No focal deficit present.      Mental Status: He is alert and oriented to person, place, and time. Mental status is at baseline.      Cranial Nerves: No cranial nerve deficit.   Psychiatric:         Mood and Affect: Mood normal.         Behavior: Behavior normal.         Fluids    Intake/Output Summary (Last 24 hours) at 1/22/2025 1343  Last data filed at 1/22/2025 1056  Gross per 24 hour   Intake 150 ml   Output 1975 ml   Net -1825 ml        Laboratory  Recent Labs     01/21/25  1313 01/21/25  2141 01/22/25  0702   WBC 14.1* 14.3* 15.8*   RBC 5.24 5.28 5.14   HEMOGLOBIN 16.5 16.7 16.2   HEMATOCRIT 48.9 48.4 47.5   MCV 93.3 91.7 92.4   MCH 31.5 31.6 31.5   MCHC 33.7 34.5 34.1   RDW 49.1 47.8 48.9   PLATELETCT 239 257 244   MPV 11.2 11.4 11.9     Recent Labs     01/21/25  1313 01/22/25  0702   SODIUM 141 139   POTASSIUM 3.9 4.0   CHLORIDE 102 98   CO2 17* 20   GLUCOSE 126* 259*   BUN 7* 7*   CREATININE 0.76 0.89   CALCIUM 9.4 9.0     Recent Labs     01/21/25  1313   APTT 26.4   INR 1.08               Imaging  DX-CHEST-PORTABLE (1 VIEW)   Final Result      Cardiomegaly.           Assessment/Plan  * Alcohol withdrawal syndrome with complication (HCC)- (present on admission)  Assessment & Plan  Patient drinks about a pint of Anand Parham per day  He wants to stop drinking  Patient currently is experiencing heartburn, generalized abdominal pain and reported vomiting some blood.  Continue CIWA protocol  Initiate thiamine folic acid multivitamin  Correct electrolytes  Stop octreotide drip as no evidence of varices per GI  Continue Librium 25 mg every 6 hours for 8 doses.    GI bleed  Assessment & Plan  No varices - stop octreotide  Start bid IV protonix per GI  Stop serial h/h  Clear diet and  adat to diabetic diet      Hypophosphatemia- (present on admission)  Assessment & Plan  Give potassium phosphate 30 mmol x 1 dose in the IV form.  Monitor phosphorus levels every morning for 72 hours    Hypomagnesemia- (present on admission)  Assessment & Plan  Magnesium is now normal.      Leukocytosis- (present on admission)  Assessment & Plan  No evidence of infection  Acute inflammatory reaction with gastritis.      Metabolic acidosis- (present on admission)  Assessment & Plan  Secondary to the acute alcohol withdrawal patient has developed metabolic acidosis  Continue to monitor  Beta-hydroxybutyrate acid is elevated at 8  VBG does show some acidosis but not to the point where he needs to be intubated or admitted to the ICU  Bicarb is 17 on the metabolic panel    Diabetes mellitus type 1 (HCC)- (present on admission)  Assessment & Plan  Obtain hemoglobin A1c level  Diabetic diet  Accu-Cheks with sliding scale coverage  Patient has an insulin pump which he refilled about 48 hours ago and will need to be refilled again.    Marijuana user- (present on admission)  Assessment & Plan  Chronic marijuana user he smokes excessive amounts of marijuana every day at this point he has been advised that he cannot smoke while he is in the hospital.  Use Haldol if needed to curb his agitation or anxiety from marijuana withdrawal         VTE prophylaxis:   SCDs/TEDs      I have performed a physical exam and reviewed and updated ROS and Plan today (1/22/2025). In review of yesterday's note (1/21/2025), there are no changes except as documented above.

## 2025-01-23 PROBLEM — E10.10 DKA, TYPE 1, NOT AT GOAL (HCC): Status: ACTIVE | Noted: 2025-01-21

## 2025-01-23 PROBLEM — I24.89 DEMAND ISCHEMIA OF MYOCARDIUM (HCC): Status: ACTIVE | Noted: 2025-01-23

## 2025-01-23 LAB
ALBUMIN SERPL BCP-MCNC: 4.1 G/DL (ref 3.2–4.9)
ALBUMIN/GLOB SERPL: 1.3 G/DL
ALP SERPL-CCNC: 79 U/L (ref 30–99)
ALT SERPL-CCNC: 13 U/L (ref 2–50)
ANION GAP SERPL CALC-SCNC: 10 MMOL/L (ref 7–16)
ANION GAP SERPL CALC-SCNC: 16 MMOL/L (ref 7–16)
ANION GAP SERPL CALC-SCNC: 17 MMOL/L (ref 7–16)
ANION GAP SERPL CALC-SCNC: 23 MMOL/L (ref 7–16)
ANION GAP SERPL CALC-SCNC: 27 MMOL/L (ref 7–16)
ANION GAP SERPL CALC-SCNC: 28 MMOL/L (ref 7–16)
AST SERPL-CCNC: 18 U/L (ref 12–45)
B-OH-BUTYR SERPL-MCNC: 6.5 MMOL/L (ref 0.02–0.27)
BASE EXCESS BLDV CALC-SCNC: -14 MMOL/L (ref -2–3)
BASOPHILS # BLD AUTO: 0 % (ref 0–1.8)
BASOPHILS # BLD: 0 K/UL (ref 0–0.12)
BILIRUB SERPL-MCNC: 0.7 MG/DL (ref 0.1–1.5)
BODY TEMPERATURE: ABNORMAL DEGREES
BUN SERPL-MCNC: 11 MG/DL (ref 8–22)
BUN SERPL-MCNC: 12 MG/DL (ref 8–22)
BUN SERPL-MCNC: 13 MG/DL (ref 8–22)
BUN SERPL-MCNC: 16 MG/DL (ref 8–22)
BUN SERPL-MCNC: 18 MG/DL (ref 8–22)
BUN SERPL-MCNC: 19 MG/DL (ref 8–22)
CALCIUM ALBUM COR SERPL-MCNC: 9.1 MG/DL (ref 8.5–10.5)
CALCIUM SERPL-MCNC: 8 MG/DL (ref 8.4–10.2)
CALCIUM SERPL-MCNC: 8.6 MG/DL (ref 8.4–10.2)
CALCIUM SERPL-MCNC: 8.7 MG/DL (ref 8.4–10.2)
CALCIUM SERPL-MCNC: 9 MG/DL (ref 8.4–10.2)
CALCIUM SERPL-MCNC: 9.2 MG/DL (ref 8.4–10.2)
CALCIUM SERPL-MCNC: 9.4 MG/DL (ref 8.4–10.2)
CHLORIDE SERPL-SCNC: 103 MMOL/L (ref 96–112)
CHLORIDE SERPL-SCNC: 104 MMOL/L (ref 96–112)
CHLORIDE SERPL-SCNC: 107 MMOL/L (ref 96–112)
CHLORIDE SERPL-SCNC: 96 MMOL/L (ref 96–112)
CHLORIDE SERPL-SCNC: 98 MMOL/L (ref 96–112)
CHLORIDE SERPL-SCNC: 99 MMOL/L (ref 96–112)
CO2 BLDV-SCNC: 10 MMOL/L (ref 20–33)
CO2 SERPL-SCNC: 10 MMOL/L (ref 20–33)
CO2 SERPL-SCNC: 13 MMOL/L (ref 20–33)
CO2 SERPL-SCNC: 16 MMOL/L (ref 20–33)
CO2 SERPL-SCNC: 18 MMOL/L (ref 20–33)
CO2 SERPL-SCNC: 19 MMOL/L (ref 20–33)
CO2 SERPL-SCNC: 23 MMOL/L (ref 20–33)
CREAT SERPL-MCNC: 0.97 MG/DL (ref 0.5–1.4)
CREAT SERPL-MCNC: 1 MG/DL (ref 0.5–1.4)
CREAT SERPL-MCNC: 1.02 MG/DL (ref 0.5–1.4)
CREAT SERPL-MCNC: 1.06 MG/DL (ref 0.5–1.4)
CREAT SERPL-MCNC: 1.23 MG/DL (ref 0.5–1.4)
CREAT SERPL-MCNC: 1.34 MG/DL (ref 0.5–1.4)
EKG IMPRESSION: NORMAL
EOSINOPHIL # BLD AUTO: 0 K/UL (ref 0–0.51)
EOSINOPHIL NFR BLD: 0 % (ref 0–6.9)
ERYTHROCYTE [DISTWIDTH] IN BLOOD BY AUTOMATED COUNT: 49.4 FL (ref 35.9–50)
ERYTHROCYTE [DISTWIDTH] IN BLOOD BY AUTOMATED COUNT: 49.9 FL (ref 35.9–50)
GFR SERPLBLD CREATININE-BSD FMLA CKD-EPI: 103 ML/MIN/1.73 M 2
GFR SERPLBLD CREATININE-BSD FMLA CKD-EPI: 106 ML/MIN/1.73 M 2
GFR SERPLBLD CREATININE-BSD FMLA CKD-EPI: 110 ML/MIN/1.73 M 2
GFR SERPLBLD CREATININE-BSD FMLA CKD-EPI: 74 ML/MIN/1.73 M 2
GFR SERPLBLD CREATININE-BSD FMLA CKD-EPI: 82 ML/MIN/1.73 M 2
GFR SERPLBLD CREATININE-BSD FMLA CKD-EPI: 99 ML/MIN/1.73 M 2
GLOBULIN SER CALC-MCNC: 3.1 G/DL (ref 1.9–3.5)
GLUCOSE BLD STRIP.AUTO-MCNC: 107 MG/DL (ref 65–99)
GLUCOSE BLD STRIP.AUTO-MCNC: 110 MG/DL (ref 65–99)
GLUCOSE BLD STRIP.AUTO-MCNC: 135 MG/DL (ref 65–99)
GLUCOSE BLD STRIP.AUTO-MCNC: 148 MG/DL (ref 65–99)
GLUCOSE BLD STRIP.AUTO-MCNC: 195 MG/DL (ref 65–99)
GLUCOSE BLD STRIP.AUTO-MCNC: 246 MG/DL (ref 65–99)
GLUCOSE BLD STRIP.AUTO-MCNC: 299 MG/DL (ref 65–99)
GLUCOSE BLD STRIP.AUTO-MCNC: 360 MG/DL (ref 65–99)
GLUCOSE BLD STRIP.AUTO-MCNC: 389 MG/DL (ref 65–99)
GLUCOSE BLD STRIP.AUTO-MCNC: 398 MG/DL (ref 65–99)
GLUCOSE BLD STRIP.AUTO-MCNC: 434 MG/DL (ref 65–99)
GLUCOSE BLD STRIP.AUTO-MCNC: 496 MG/DL (ref 65–99)
GLUCOSE BLD STRIP.AUTO-MCNC: 90 MG/DL (ref 65–99)
GLUCOSE BLD STRIP.AUTO-MCNC: 97 MG/DL (ref 65–99)
GLUCOSE SERPL-MCNC: 101 MG/DL (ref 65–99)
GLUCOSE SERPL-MCNC: 130 MG/DL (ref 65–99)
GLUCOSE SERPL-MCNC: 231 MG/DL (ref 65–99)
GLUCOSE SERPL-MCNC: 293 MG/DL (ref 65–99)
GLUCOSE SERPL-MCNC: 294 MG/DL (ref 65–99)
GLUCOSE SERPL-MCNC: 416 MG/DL (ref 65–99)
HCO3 BLDV-SCNC: 9.7 MMOL/L (ref 22–29)
HCT VFR BLD AUTO: 45 % (ref 42–52)
HCT VFR BLD AUTO: 45.5 % (ref 42–52)
HGB BLD-MCNC: 15.1 G/DL (ref 14–18)
HGB BLD-MCNC: 15.3 G/DL (ref 14–18)
LACTATE BLD-SCNC: 2 MMOL/L (ref 0.5–2)
LYMPHOCYTES # BLD AUTO: 0.74 K/UL (ref 1–4.8)
LYMPHOCYTES NFR BLD: 4 % (ref 22–41)
MAGNESIUM SERPL-MCNC: 1.6 MG/DL (ref 1.5–2.5)
MAGNESIUM SERPL-MCNC: 1.6 MG/DL (ref 1.5–2.5)
MAGNESIUM SERPL-MCNC: 2 MG/DL (ref 1.5–2.5)
MANUAL DIFF BLD: NORMAL
MCH RBC QN AUTO: 31.6 PG (ref 27–33)
MCH RBC QN AUTO: 31.7 PG (ref 27–33)
MCHC RBC AUTO-ENTMCNC: 33.2 G/DL (ref 32.3–36.5)
MCHC RBC AUTO-ENTMCNC: 34 G/DL (ref 32.3–36.5)
MCV RBC AUTO: 93 FL (ref 81.4–97.8)
MCV RBC AUTO: 95.4 FL (ref 81.4–97.8)
MONOCYTES # BLD AUTO: 1.84 K/UL (ref 0–0.85)
MONOCYTES NFR BLD AUTO: 10 % (ref 0–13.4)
NEUTROPHILS # BLD AUTO: 15.82 K/UL (ref 1.82–7.42)
NEUTROPHILS NFR BLD: 86 % (ref 44–72)
NRBC # BLD AUTO: 0 K/UL
NRBC BLD-RTO: 0 /100 WBC (ref 0–0.2)
PCO2 BLDV: 19.7 MMHG (ref 38–54)
PCO2 TEMP ADJ BLDV: 19 MMHG (ref 38–54)
PH BLDV: 7.3 [PH] (ref 7.31–7.45)
PH TEMP ADJ BLDV: 7.31 [PH] (ref 7.31–7.45)
PHOSPHATE SERPL-MCNC: 1.4 MG/DL (ref 2.5–4.5)
PHOSPHATE SERPL-MCNC: 2.2 MG/DL (ref 2.5–4.5)
PHOSPHATE SERPL-MCNC: 2.4 MG/DL (ref 2.5–4.5)
PLATELET # BLD AUTO: 181 K/UL (ref 164–446)
PLATELET # BLD AUTO: 245 K/UL (ref 164–446)
PLATELET BLD QL SMEAR: NORMAL
PMV BLD AUTO: 11.9 FL (ref 9–12.9)
PMV BLD AUTO: 12 FL (ref 9–12.9)
PO2 BLDV: 70 MMHG (ref 23–48)
PO2 TEMP ADJ BLDV: 67 MMHG
POTASSIUM SERPL-SCNC: 3.8 MMOL/L (ref 3.6–5.5)
POTASSIUM SERPL-SCNC: 4.1 MMOL/L (ref 3.6–5.5)
POTASSIUM SERPL-SCNC: 4.2 MMOL/L (ref 3.6–5.5)
POTASSIUM SERPL-SCNC: 4.2 MMOL/L (ref 3.6–5.5)
POTASSIUM SERPL-SCNC: 4.4 MMOL/L (ref 3.6–5.5)
POTASSIUM SERPL-SCNC: 5.4 MMOL/L (ref 3.6–5.5)
PROT SERPL-MCNC: 7.2 G/DL (ref 6–8.2)
RBC # BLD AUTO: 4.77 M/UL (ref 4.7–6.1)
RBC # BLD AUTO: 4.84 M/UL (ref 4.7–6.1)
RBC BLD AUTO: NORMAL
SAO2 % BLDV: 93 % (ref 60–85)
SMUDGE CELLS BLD QL SMEAR: NORMAL
SODIUM SERPL-SCNC: 133 MMOL/L (ref 135–145)
SODIUM SERPL-SCNC: 138 MMOL/L (ref 135–145)
SODIUM SERPL-SCNC: 138 MMOL/L (ref 135–145)
SODIUM SERPL-SCNC: 139 MMOL/L (ref 135–145)
SODIUM SERPL-SCNC: 139 MMOL/L (ref 135–145)
SODIUM SERPL-SCNC: 140 MMOL/L (ref 135–145)
SPECIMEN DRAWN FROM PATIENT: ABNORMAL
TROPONIN T SERPL-MCNC: 25 NG/L (ref 6–19)
TROPONIN T SERPL-MCNC: 29 NG/L (ref 6–19)
TROPONIN T SERPL-MCNC: 36 NG/L (ref 6–19)
TROPONIN T SERPL-MCNC: 52 NG/L (ref 6–19)
WBC # BLD AUTO: 16.7 K/UL (ref 4.8–10.8)
WBC # BLD AUTO: 18.4 K/UL (ref 4.8–10.8)

## 2025-01-23 PROCEDURE — 93010 ELECTROCARDIOGRAM REPORT: CPT | Performed by: INTERNAL MEDICINE

## 2025-01-23 PROCEDURE — 700105 HCHG RX REV CODE 258: Performed by: HOSPITALIST

## 2025-01-23 PROCEDURE — 82803 BLOOD GASES ANY COMBINATION: CPT

## 2025-01-23 PROCEDURE — 99291 CRITICAL CARE FIRST HOUR: CPT | Performed by: INTERNAL MEDICINE

## 2025-01-23 PROCEDURE — 83605 ASSAY OF LACTIC ACID: CPT

## 2025-01-23 PROCEDURE — 85027 COMPLETE CBC AUTOMATED: CPT

## 2025-01-23 PROCEDURE — 84484 ASSAY OF TROPONIN QUANT: CPT

## 2025-01-23 PROCEDURE — 36415 COLL VENOUS BLD VENIPUNCTURE: CPT

## 2025-01-23 PROCEDURE — 84100 ASSAY OF PHOSPHORUS: CPT

## 2025-01-23 PROCEDURE — 83735 ASSAY OF MAGNESIUM: CPT | Mod: 91

## 2025-01-23 PROCEDURE — 51798 US URINE CAPACITY MEASURE: CPT

## 2025-01-23 PROCEDURE — 99291 CRITICAL CARE FIRST HOUR: CPT | Performed by: HOSPITALIST

## 2025-01-23 PROCEDURE — 80048 BASIC METABOLIC PNL TOTAL CA: CPT

## 2025-01-23 PROCEDURE — 700105 HCHG RX REV CODE 258: Performed by: STUDENT IN AN ORGANIZED HEALTH CARE EDUCATION/TRAINING PROGRAM

## 2025-01-23 PROCEDURE — 700102 HCHG RX REV CODE 250 W/ 637 OVERRIDE(OP): Performed by: INTERNAL MEDICINE

## 2025-01-23 PROCEDURE — 700101 HCHG RX REV CODE 250: Performed by: HOSPITALIST

## 2025-01-23 PROCEDURE — 82010 KETONE BODYS QUAN: CPT

## 2025-01-23 PROCEDURE — A9270 NON-COVERED ITEM OR SERVICE: HCPCS | Performed by: INTERNAL MEDICINE

## 2025-01-23 PROCEDURE — 85007 BL SMEAR W/DIFF WBC COUNT: CPT

## 2025-01-23 PROCEDURE — 82962 GLUCOSE BLOOD TEST: CPT | Mod: 91

## 2025-01-23 PROCEDURE — 94760 N-INVAS EAR/PLS OXIMETRY 1: CPT

## 2025-01-23 PROCEDURE — 700111 HCHG RX REV CODE 636 W/ 250 OVERRIDE (IP): Mod: JZ | Performed by: HOSPITALIST

## 2025-01-23 PROCEDURE — 80053 COMPREHEN METABOLIC PANEL: CPT

## 2025-01-23 PROCEDURE — 700111 HCHG RX REV CODE 636 W/ 250 OVERRIDE (IP): Performed by: INTERNAL MEDICINE

## 2025-01-23 PROCEDURE — 700111 HCHG RX REV CODE 636 W/ 250 OVERRIDE (IP): Performed by: HOSPITALIST

## 2025-01-23 PROCEDURE — 770022 HCHG ROOM/CARE - ICU (200)

## 2025-01-23 PROCEDURE — A9270 NON-COVERED ITEM OR SERVICE: HCPCS | Performed by: HOSPITALIST

## 2025-01-23 PROCEDURE — 700102 HCHG RX REV CODE 250 W/ 637 OVERRIDE(OP): Performed by: STUDENT IN AN ORGANIZED HEALTH CARE EDUCATION/TRAINING PROGRAM

## 2025-01-23 PROCEDURE — 93005 ELECTROCARDIOGRAM TRACING: CPT | Mod: TC | Performed by: HOSPITALIST

## 2025-01-23 PROCEDURE — A9270 NON-COVERED ITEM OR SERVICE: HCPCS | Mod: JZ | Performed by: STUDENT IN AN ORGANIZED HEALTH CARE EDUCATION/TRAINING PROGRAM

## 2025-01-23 PROCEDURE — 700102 HCHG RX REV CODE 250 W/ 637 OVERRIDE(OP): Performed by: HOSPITALIST

## 2025-01-23 RX ORDER — DEXTROSE MONOHYDRATE 25 G/50ML
25 INJECTION, SOLUTION INTRAVENOUS
Status: DISCONTINUED | OUTPATIENT
Start: 2025-01-23 | End: 2025-01-23

## 2025-01-23 RX ORDER — INSULIN LISPRO 100 [IU]/ML
2-10 INJECTION, SOLUTION INTRAVENOUS; SUBCUTANEOUS EVERY 6 HOURS
Status: DISCONTINUED | OUTPATIENT
Start: 2025-01-23 | End: 2025-01-23

## 2025-01-23 RX ORDER — LORAZEPAM 2 MG/ML
2 INJECTION INTRAMUSCULAR
Status: DISCONTINUED | OUTPATIENT
Start: 2025-01-23 | End: 2025-01-25

## 2025-01-23 RX ORDER — CHLORDIAZEPOXIDE HYDROCHLORIDE 25 MG/1
50 CAPSULE, GELATIN COATED ORAL EVERY 6 HOURS
Status: DISCONTINUED | OUTPATIENT
Start: 2025-01-23 | End: 2025-01-25 | Stop reason: HOSPADM

## 2025-01-23 RX ORDER — LORAZEPAM 2 MG/ML
4 INJECTION INTRAMUSCULAR
Status: DISCONTINUED | OUTPATIENT
Start: 2025-01-23 | End: 2025-01-25

## 2025-01-23 RX ORDER — SODIUM CHLORIDE, SODIUM LACTATE, POTASSIUM CHLORIDE, AND CALCIUM CHLORIDE .6; .31; .03; .02 G/100ML; G/100ML; G/100ML; G/100ML
2000 INJECTION, SOLUTION INTRAVENOUS ONCE
Status: COMPLETED | OUTPATIENT
Start: 2025-01-23 | End: 2025-01-23

## 2025-01-23 RX ORDER — LORAZEPAM 2 MG/ML
1 INJECTION INTRAMUSCULAR
Status: DISCONTINUED | OUTPATIENT
Start: 2025-01-23 | End: 2025-01-25

## 2025-01-23 RX ORDER — DEXTROSE MONOHYDRATE 25 G/50ML
25 INJECTION, SOLUTION INTRAVENOUS
Status: DISCONTINUED | OUTPATIENT
Start: 2025-01-23 | End: 2025-01-24

## 2025-01-23 RX ORDER — DEXTROSE AND SODIUM CHLORIDE 10; .45 G/100ML; G/100ML
INJECTION, SOLUTION INTRAVENOUS CONTINUOUS
Status: DISCONTINUED | OUTPATIENT
Start: 2025-01-23 | End: 2025-01-23

## 2025-01-23 RX ORDER — DEXMEDETOMIDINE HYDROCHLORIDE 4 UG/ML
.1-1.5 INJECTION, SOLUTION INTRAVENOUS CONTINUOUS
Status: DISCONTINUED | OUTPATIENT
Start: 2025-01-23 | End: 2025-01-25

## 2025-01-23 RX ORDER — LORAZEPAM 2 MG/ML
3 INJECTION INTRAMUSCULAR
Status: DISCONTINUED | OUTPATIENT
Start: 2025-01-23 | End: 2025-01-25

## 2025-01-23 RX ORDER — POTASSIUM CHLORIDE 7.45 MG/ML
10 INJECTION INTRAVENOUS ONCE
Status: COMPLETED | OUTPATIENT
Start: 2025-01-23 | End: 2025-01-23

## 2025-01-23 RX ORDER — DEXTROSE MONOHYDRATE AND SODIUM CHLORIDE 5; .45 G/100ML; G/100ML
INJECTION, SOLUTION INTRAVENOUS CONTINUOUS
Status: DISCONTINUED | OUTPATIENT
Start: 2025-01-23 | End: 2025-01-23

## 2025-01-23 RX ORDER — SODIUM CHLORIDE 9 MG/ML
INJECTION, SOLUTION INTRAVENOUS CONTINUOUS
Status: DISCONTINUED | OUTPATIENT
Start: 2025-01-23 | End: 2025-01-23

## 2025-01-23 RX ORDER — SODIUM CHLORIDE 9 MG/ML
1000 INJECTION, SOLUTION INTRAVENOUS ONCE
Status: COMPLETED | OUTPATIENT
Start: 2025-01-23 | End: 2025-01-23

## 2025-01-23 RX ORDER — SODIUM CHLORIDE, SODIUM LACTATE, POTASSIUM CHLORIDE, AND CALCIUM CHLORIDE .6; .31; .03; .02 G/100ML; G/100ML; G/100ML; G/100ML
1000 INJECTION, SOLUTION INTRAVENOUS ONCE
Status: COMPLETED | OUTPATIENT
Start: 2025-01-23 | End: 2025-01-23

## 2025-01-23 RX ORDER — POTASSIUM CHLORIDE 1500 MG/1
20 TABLET, EXTENDED RELEASE ORAL ONCE
Status: COMPLETED | OUTPATIENT
Start: 2025-01-23 | End: 2025-01-23

## 2025-01-23 RX ORDER — MAGNESIUM SULFATE HEPTAHYDRATE 40 MG/ML
2 INJECTION, SOLUTION INTRAVENOUS
Status: COMPLETED | OUTPATIENT
Start: 2025-01-23 | End: 2025-01-23

## 2025-01-23 RX ORDER — POTASSIUM CHLORIDE 7.45 MG/ML
10 INJECTION INTRAVENOUS
Status: COMPLETED | OUTPATIENT
Start: 2025-01-23 | End: 2025-01-23

## 2025-01-23 RX ORDER — LORAZEPAM 2 MG/ML
2 INJECTION INTRAMUSCULAR ONCE
Status: DISCONTINUED | OUTPATIENT
Start: 2025-01-23 | End: 2025-01-23

## 2025-01-23 RX ORDER — INSULIN LISPRO 100 [IU]/ML
2-9 INJECTION, SOLUTION INTRAVENOUS; SUBCUTANEOUS
Status: DISCONTINUED | OUTPATIENT
Start: 2025-01-23 | End: 2025-01-24

## 2025-01-23 RX ORDER — MAGNESIUM SULFATE HEPTAHYDRATE 40 MG/ML
4 INJECTION, SOLUTION INTRAVENOUS
Status: COMPLETED | OUTPATIENT
Start: 2025-01-23 | End: 2025-01-23

## 2025-01-23 RX ADMIN — SODIUM CHLORIDE: 9 INJECTION, SOLUTION INTRAVENOUS at 00:30

## 2025-01-23 RX ADMIN — SODIUM CHLORIDE 1000 ML: 9 INJECTION, SOLUTION INTRAVENOUS at 05:45

## 2025-01-23 RX ADMIN — LORAZEPAM 2 MG: 2 INJECTION INTRAMUSCULAR; INTRAVENOUS at 05:39

## 2025-01-23 RX ADMIN — LORAZEPAM 1 MG: 2 INJECTION INTRAMUSCULAR; INTRAVENOUS at 03:24

## 2025-01-23 RX ADMIN — DEXMEDETOMIDINE HYDROCHLORIDE 0.4 MCG/KG/HR: 4 INJECTION, SOLUTION INTRAVENOUS at 13:41

## 2025-01-23 RX ADMIN — POTASSIUM CHLORIDE 10 MEQ: 7.46 INJECTION, SOLUTION INTRAVENOUS at 08:44

## 2025-01-23 RX ADMIN — SODIUM CHLORIDE, SODIUM LACTATE, POTASSIUM CHLORIDE, AND CALCIUM CHLORIDE 2000 ML: .6; .31; .03; .02 INJECTION, SOLUTION INTRAVENOUS at 08:08

## 2025-01-23 RX ADMIN — POTASSIUM CHLORIDE 10 MEQ: 7.46 INJECTION, SOLUTION INTRAVENOUS at 17:12

## 2025-01-23 RX ADMIN — PANTOPRAZOLE SODIUM 40 MG: 40 INJECTION, POWDER, FOR SOLUTION INTRAVENOUS at 05:41

## 2025-01-23 RX ADMIN — LORAZEPAM 2 MG: 2 INJECTION INTRAMUSCULAR; INTRAVENOUS at 23:16

## 2025-01-23 RX ADMIN — SODIUM CHLORIDE, SODIUM LACTATE, POTASSIUM CHLORIDE, AND CALCIUM CHLORIDE 1000 ML: .6; .31; .03; .02 INJECTION, SOLUTION INTRAVENOUS at 22:41

## 2025-01-23 RX ADMIN — POTASSIUM CHLORIDE 20 MEQ: 1500 TABLET, EXTENDED RELEASE ORAL at 19:40

## 2025-01-23 RX ADMIN — LORAZEPAM 2 MG: 2 INJECTION INTRAMUSCULAR; INTRAVENOUS at 06:35

## 2025-01-23 RX ADMIN — INSULIN HUMAN 4 UNITS/HR: 1 INJECTION, SOLUTION INTRAVENOUS at 08:30

## 2025-01-23 RX ADMIN — CHLORDIAZEPOXIDE HYDROCHLORIDE 50 MG: 25 CAPSULE ORAL at 21:11

## 2025-01-23 RX ADMIN — MAGNESIUM SULFATE HEPTAHYDRATE 2 G: 2 INJECTION, SOLUTION INTRAVENOUS at 15:11

## 2025-01-23 RX ADMIN — LORAZEPAM 2 MG: 2 INJECTION INTRAMUSCULAR; INTRAVENOUS at 04:15

## 2025-01-23 RX ADMIN — SODIUM CHLORIDE: 9 INJECTION, SOLUTION INTRAVENOUS at 10:15

## 2025-01-23 RX ADMIN — THERA TABS 1 TABLET: TAB at 05:41

## 2025-01-23 RX ADMIN — LORAZEPAM 2 MG: 2 INJECTION INTRAMUSCULAR; INTRAVENOUS at 21:37

## 2025-01-23 RX ADMIN — INSULIN HUMAN 7 UNITS: 1 INJECTION, SOLUTION INTRAVENOUS at 08:17

## 2025-01-23 RX ADMIN — ENOXAPARIN SODIUM 40 MG: 100 INJECTION SUBCUTANEOUS at 17:08

## 2025-01-23 RX ADMIN — CHLORDIAZEPOXIDE HYDROCHLORIDE 50 MG: 25 CAPSULE ORAL at 16:33

## 2025-01-23 RX ADMIN — PANTOPRAZOLE SODIUM 40 MG: 40 INJECTION, POWDER, FOR SOLUTION INTRAVENOUS at 17:09

## 2025-01-23 RX ADMIN — DEXTROSE AND SODIUM CHLORIDE: 5; 450 INJECTION, SOLUTION INTRAVENOUS at 13:41

## 2025-01-23 RX ADMIN — DEXMEDETOMIDINE HYDROCHLORIDE 0.2 MCG/KG/HR: 4 INJECTION, SOLUTION INTRAVENOUS at 06:08

## 2025-01-23 RX ADMIN — CHLORDIAZEPOXIDE HYDROCHLORIDE 25 MG: 25 CAPSULE ORAL at 03:24

## 2025-01-23 RX ADMIN — LORAZEPAM 2 MG: 2 INJECTION INTRAMUSCULAR; INTRAVENOUS at 06:16

## 2025-01-23 RX ADMIN — POTASSIUM CHLORIDE 10 MEQ: 7.46 INJECTION, SOLUTION INTRAVENOUS at 18:40

## 2025-01-23 RX ADMIN — INSULIN GLARGINE-YFGN 15 UNITS: 100 INJECTION, SOLUTION SUBCUTANEOUS at 20:00

## 2025-01-23 RX ADMIN — DEXTROSE AND SODIUM CHLORIDE: 10; .45 INJECTION, SOLUTION INTRAVENOUS at 15:34

## 2025-01-23 RX ADMIN — FOLIC ACID 1 MG: 1 TABLET ORAL at 05:42

## 2025-01-23 RX ADMIN — POTASSIUM CHLORIDE 10 MEQ: 7.46 INJECTION, SOLUTION INTRAVENOUS at 11:09

## 2025-01-23 RX ADMIN — Medication 100 MG: at 05:42

## 2025-01-23 RX ADMIN — GABAPENTIN 900 MG: 300 CAPSULE ORAL at 05:41

## 2025-01-23 ASSESSMENT — LIFESTYLE VARIABLES
TOTAL SCORE: 2
PAROXYSMAL SWEATS: BARELY PERCEPTIBLE SWEATING. PALMS MOIST
AUDITORY DISTURBANCES: NOT PRESENT
ORIENTATION AND CLOUDING OF SENSORIUM: DATE DISORIENTATION BY NO MORE THAN TWO CALENDAR DAYS
NAUSEA AND VOMITING: MILD NAUSEA WITH NO VOMITING
TOTAL SCORE: VERY MILD ITCHING, PINS AND NEEDLES SENSATION, BURNING OR NUMBNESS
TREMOR: MODERATE TREMOR WITH ARMS EXTENDED
TREMOR: *
TOTAL SCORE: VERY MILD ITCHING, PINS AND NEEDLES SENSATION, BURNING OR NUMBNESS
ANXIETY: NO ANXIETY (AT EASE)
TOTAL SCORE: 12
HEADACHE, FULLNESS IN HEAD: MODERATELY SEVERE
VISUAL DISTURBANCES: NOT PRESENT
AGITATION: NORMAL ACTIVITY
ORIENTATION AND CLOUDING OF SENSORIUM: DATE DISORIENTATION BY NO MORE THAN TWO CALENDAR DAYS
PAROXYSMAL SWEATS: *
AGITATION: *
AGITATION: NORMAL ACTIVITY
NAUSEA AND VOMITING: NO NAUSEA AND NO VOMITING
HEADACHE, FULLNESS IN HEAD: NOT PRESENT
TOTAL SCORE: 31
HEADACHE, FULLNESS IN HEAD: MILD
NAUSEA AND VOMITING: NO NAUSEA AND NO VOMITING
TOTAL SCORE: 17
AUDITORY DISTURBANCES: NOT PRESENT
HEADACHE, FULLNESS IN HEAD: NOT PRESENT
PAROXYSMAL SWEATS: NO SWEAT VISIBLE
AUDITORY DISTURBANCES: NOT PRESENT
ANXIETY: MODERATELY ANXIOUS OR GUARDED, SO ANXIETY IS INFERRED
VISUAL DISTURBANCES: NOT PRESENT
AGITATION: *
ORIENTATION AND CLOUDING OF SENSORIUM: ORIENTED AND CAN DO SERIAL ADDITIONS
ORIENTATION AND CLOUDING OF SENSORIUM: ORIENTED AND CAN DO SERIAL ADDITIONS
HEADACHE, FULLNESS IN HEAD: NOT PRESENT
ANXIETY: EQUIVALENT TO ACUTE PANIC STATES AS OCCUR IN SEVERE DELIRIUM OR ACUTE SCHIZOPHRENIC REACTIONS
VISUAL DISTURBANCES: NOT PRESENT
HEADACHE, FULLNESS IN HEAD: NOT PRESENT
TOTAL SCORE: 4
PAROXYSMAL SWEATS: BARELY PERCEPTIBLE SWEATING. PALMS MOIST
VISUAL DISTURBANCES: NOT PRESENT
TOTAL SCORE: MILD ITCHING, PINS AND NEEDLES SENSATION, BURNING OR NUMBNESS
AUDITORY DISTURBANCES: NOT PRESENT
ANXIETY: *
ANXIETY: MILDLY ANXIOUS
TREMOR: NO TREMOR
AUDITORY DISTURBANCES: NOT PRESENT
HEADACHE, FULLNESS IN HEAD: VERY MILD
VISUAL DISTURBANCES: NOT PRESENT
PAROXYSMAL SWEATS: NO SWEAT VISIBLE
TREMOR: *
TOTAL SCORE: 9
TOTAL SCORE: 2
ORIENTATION AND CLOUDING OF SENSORIUM: DATE DISORIENTATION BY NO MORE THAN TWO CALENDAR DAYS
AUDITORY DISTURBANCES: NOT PRESENT
AGITATION: NORMAL ACTIVITY
VISUAL DISTURBANCES: NOT PRESENT
AGITATION: *
NAUSEA AND VOMITING: *
TREMOR: *
VISUAL DISTURBANCES: MODERATELY SEVERE HALLUCINATIONS
TREMOR: TREMOR NOT VISIBLE BUT CAN BE FELT, FINGERTIP TO FINGERTIP
AGITATION: *
NAUSEA AND VOMITING: MILD NAUSEA WITH NO VOMITING
ORIENTATION AND CLOUDING OF SENSORIUM: ORIENTED AND CAN DO SERIAL ADDITIONS
PAROXYSMAL SWEATS: BARELY PERCEPTIBLE SWEATING. PALMS MOIST
TREMOR: NO TREMOR
AUDITORY DISTURBANCES: NOT PRESENT
ANXIETY: NO ANXIETY (AT EASE)
PAROXYSMAL SWEATS: *
ORIENTATION AND CLOUDING OF SENSORIUM: DATE DISORIENTATION BY NO MORE THAN TWO CALENDAR DAYS
NAUSEA AND VOMITING: NO NAUSEA AND NO VOMITING
ANXIETY: MILDLY ANXIOUS
NAUSEA AND VOMITING: NO NAUSEA AND NO VOMITING

## 2025-01-23 ASSESSMENT — PAIN DESCRIPTION - PAIN TYPE
TYPE: ACUTE PAIN

## 2025-01-23 ASSESSMENT — PAIN SCALES - GENERAL: PAIN_LEVEL: 0

## 2025-01-23 NOTE — CARE PLAN
The patient is Watcher - Medium risk of patient condition declining or worsening    Shift Goals  Clinical Goals: CIWA, fall safety, monitor vitals, monitor labs  Patient Goals: sleep    Progress made toward(s) clinical / shift goals:        Problem: Knowledge Deficit - Standard  Goal: Patient and family/care givers will demonstrate understanding of plan of care, disease process/condition, diagnostic tests and medications  Outcome: Progressing     Problem: Seizure Precautions  Goal: Implementation of seizure precautions  Outcome: Progressing     Problem: Psychosocial  Goal: Patient's level of anxiety will decrease  Outcome: Progressing     Problem: Pain - Standard  Goal: Alleviation of pain or a reduction in pain to the patient’s comfort goal  Outcome: Progressing     Problem: Fall Risk  Goal: Patient will remain free from falls  Outcome: Progressing       Patient is not progressing towards the following goals:      Problem: Optimal Care for Alcohol Withdrawal  Goal: Optimal Care for the alcohol withdrawal patient  Outcome: Not Progressing

## 2025-01-23 NOTE — ANESTHESIA POSTPROCEDURE EVALUATION
Patient: Karlie Nathan    Procedure Summary       Date: 01/22/25 Room / Location:  ENDOSCOPIC ULTRASOUND ROOM / SURGERY [facility]    Anesthesia Start: 1232 Anesthesia Stop: 1256    Procedures:       ENDOSCOPY      ESOPHAGOGASTROSCOPY Diagnosis:       Esophagitis      (Esophagitis [530.1.ICD-9-CM])    Surgeons: Marc Glover D.O. Responsible Provider: Fabien Grullon III, M.D.    Anesthesia Type: general ASA Status: 3 - Emergent            Final Anesthesia Type: general  Last vitals  BP   Blood Pressure: 108/56    Temp   36.4 °C (97.5 °F)    Pulse   (!) 130   Resp   (!) 36    SpO2   95 %      Anesthesia Post Evaluation    Patient location during evaluation: PACU  Patient participation: complete - patient participated  Level of consciousness: awake and alert  Pain score: 0    Airway patency: patent  Anesthetic complications: no  Cardiovascular status: hemodynamically stable  Respiratory status: acceptable  Hydration status: euvolemic    PONV: none          No notable events documented.     Nurse Pain Score: 0 (NPRS)

## 2025-01-23 NOTE — CONSULTS
DATE OF SERVICE:  01/22/2025     REASON FOR CONSULTATION:  GI bleed.     HISTORY OF PRESENT ILLNESS:  The patient is a 25-year-old alcoholic, who was   transferred from [place] for intractable nausea, vomiting and hematemesis   as well as melanotic stools.  The patient recently stopped drinking and was   drinking a pint of Anand Leija's day.  The patient was started on octreotide   drip at outside facility as well as IV PPI.  The patient having active nausea   and vomiting while in preop this afternoon.  The patient does admit to some   generalized abdominal pain.  The patient denies any current fever, chills,   chest pain, shortness of breath.  The patient denies any difficulty   swallowing, but does admit to acid reflux.     PAST MEDICAL HISTORY:  Diabetes, alcohol abuse.     PAST SURGICAL HISTORY:  None listed.  The patient denies any previous   endoscopy.     FAMILY HISTORY:  Noncontributory.     SOCIAL HISTORY:  The patient admits to tobacco use.  The patient reports heavy   alcohol use, although very recently stopped.  The patient does admit to   marijuana use.     ALLERGIES:  No known drug allergies.     MEDICATIONS:  See med rec list.     REVIEW OF SYSTEMS:  A 14-point review of systems was obtained and found to be   negative except for those above in the HPI.     PHYSICAL EXAMINATION:  GENERAL:  The patient is quite ill appearing.  HEENT:  PERRLA.  Extraocular movements intact.  Sclerae are anicteric.  HEART:  Regular rhythm with tachycardia.  LUNGS:  Clear to auscultation bilaterally.  ABDOMEN:  Soft, minimal tenderness to deep palpation generalized.  No guarding   or rebound.  MUSCULOSKELETAL:  No edema noted on bilateral lower extremities.  NEUROLOGIC:  Grossly intact.  PSYCHIATRIC:  Affect is normal.  SKIN:  No jaundice or pallor.     LABORATORY DATA:  White count 15.8, hemoglobin 16.2, hematocrit 47.5,   platelets 244.  Sodium 139, potassium 4, glucose 259.  BUN 7, creatinine 0.89,   AST 23, ALT 18,  alkaline phosphatase 76, total bilirubin 0.7, lipase 5.    Lactic acid 1.  INR 1.08.     ASSESSMENT AND PLAN:  1.  Alcohol abuse.  Counseled cessation of alcohol.  2.  GI bleed, suspect upper GI source.  Plan for EGD, see procedure note for   further details.  Recommend IV octreotide and IV pantoprazole.  Keep the   patient n.p.o. and hold any anticoagulation.  3.  Nausea and vomiting.  Antiemetics as needed.  4.  Alcohol withdrawal.  The patient being treated with CIWA protocol and   given benzodiazepines just prior to bring him back into the OR.  5.  Abdominal pain, mild, but generalized abdominal pain.  6.  Diabetes, being treated by primary team.        ______________________________  DO FRANKI Stewart    DD:  01/22/2025 12:36  DT:  01/22/2025 16:03    Job#:  [id number]

## 2025-01-23 NOTE — PROGRESS NOTES
Telemetry Shift Summary     Rhythm: ST  Rate: 117-134  Measurements: 0.14/0.08/0.34  Ectopy (reported by Monitor Tech): rPVC, rPAC     Normal Values  Rhythm: Sinus  HR:   Measurements: 0.12-0.20/0.06-0.10/0.30-0.52

## 2025-01-23 NOTE — ANESTHESIA TIME REPORT
Anesthesia Start and Stop Event Times       Date Time Event    1/22/2025 1221 Ready for Procedure     1232 Anesthesia Start     1256 Anesthesia Stop          Responsible Staff  01/22/25      Name Role Begin End    Fabien Grullon III, M.D. Anesth 1232 1256          Overtime Reason:  no overtime (within assigned shift)    Comments:

## 2025-01-23 NOTE — PROGRESS NOTES
OVERNIGHT HOSPITALIST:    Paged by nursing Staff about this patient, CIWA 31 now. It was 20 prior. I came to the room to see patient and transferred him to the ICU. He also had Chest pain that now resolved.      Brief History: 26 yo M, with h/o Type I DM with insulin pump, admitted on 1/21 with alcohol withdrawal, metabolic acidosis. Initial Beta-hydroxybutyric acid was >8, but no acidosis on VBG. Trending up CIWA. This am Anion Gap 23, CO2 16. Glucose is 293.       Vitals: Temp:36.4       HR:130      RR:30      BP:129/73  Gen:Looks unwell, mild distress  Lungs:Tachypnea, no wheezing  Heart:tachycardic, no murmur  Abd:Soft, not tender and not distended    A/P:  #1:Severe alcohol withdraw  #2:Metabolic acidosis/ developing DKA under the context of Type I DM  #3.Atypical Chest Pain    -Transfer to the ICU  -IVF: 1 L NS bolus now  -STAT VBG, added beta-hydroxybutyric acid to earlier labs  -Q4 H BMP - next check at 6 am  -Start Precedex drip and adjusted CIWA with RASS  -Low threshold for initiating DKA protocol  -D/c Insulin pump for now  -STAT EKG was done. No Acute ischemia. At this time, no longer having chest pain. Troponin 25 will continue to check serial troponin          The patient remains critically ill.  Critical care time =43  minutes in directly providing and coordinating critical care and extensive data review.  This includes time spent before the visit reviewing the chart, time spent evaluating the patient face-to-face  in the room, time discussing and updating Nursing Staff about plan and time spent after the visit reviewing results and on documentation. No time overlap and excludes procedures.

## 2025-01-23 NOTE — CARE PLAN
The patient is Stable - Low risk of patient condition declining or worsening    Shift Goals  Clinical Goals: Insulin Drip / Electrolyte replacement / ETOH management  Patient Goals: Go Home  Family Goals: NICOLE    Progress made toward(s) clinical / shift goals:  Weaning Precedex drip. Pt on insulin drip.    Patient is not progressing towards the following goals:

## 2025-01-24 ENCOUNTER — APPOINTMENT (OUTPATIENT)
Dept: RADIOLOGY | Facility: MEDICAL CENTER | Age: 25
End: 2025-01-24
Attending: INTERNAL MEDICINE
Payer: MEDICAID

## 2025-01-24 PROBLEM — R65.10 SIRS (SYSTEMIC INFLAMMATORY RESPONSE SYNDROME) (HCC): Status: ACTIVE | Noted: 2025-01-21

## 2025-01-24 LAB
ANION GAP SERPL CALC-SCNC: 11 MMOL/L (ref 7–16)
ANION GAP SERPL CALC-SCNC: 11 MMOL/L (ref 7–16)
ANION GAP SERPL CALC-SCNC: 13 MMOL/L (ref 7–16)
ANION GAP SERPL CALC-SCNC: 20 MMOL/L (ref 7–16)
ANION GAP SERPL CALC-SCNC: 21 MMOL/L (ref 7–16)
APPEARANCE UR: CLEAR
B-OH-BUTYR SERPL-MCNC: 1.4 MMOL/L (ref 0.02–0.27)
B-OH-BUTYR SERPL-MCNC: >8 MMOL/L (ref 0.02–0.27)
BACTERIA #/AREA URNS HPF: NORMAL /HPF
BASE EXCESS BLDV CALC-SCNC: -8 MMOL/L (ref -2–3)
BILIRUB UR QL STRIP.AUTO: NEGATIVE
BODY TEMPERATURE: 36.9 CENTIGRADE
BUN SERPL-MCNC: 12 MG/DL (ref 8–22)
BUN SERPL-MCNC: 4 MG/DL (ref 8–22)
BUN SERPL-MCNC: 5 MG/DL (ref 8–22)
BUN SERPL-MCNC: 6 MG/DL (ref 8–22)
BUN SERPL-MCNC: 8 MG/DL (ref 8–22)
CALCIUM SERPL-MCNC: 8.7 MG/DL (ref 8.4–10.2)
CALCIUM SERPL-MCNC: 8.7 MG/DL (ref 8.4–10.2)
CALCIUM SERPL-MCNC: 8.8 MG/DL (ref 8.4–10.2)
CALCIUM SERPL-MCNC: 8.9 MG/DL (ref 8.4–10.2)
CALCIUM SERPL-MCNC: 9.6 MG/DL (ref 8.4–10.2)
CASTS URNS QL MICRO: NORMAL /LPF (ref 0–2)
CHLORIDE SERPL-SCNC: 100 MMOL/L (ref 96–112)
CHLORIDE SERPL-SCNC: 102 MMOL/L (ref 96–112)
CHLORIDE SERPL-SCNC: 102 MMOL/L (ref 96–112)
CHLORIDE SERPL-SCNC: 103 MMOL/L (ref 96–112)
CHLORIDE SERPL-SCNC: 103 MMOL/L (ref 96–112)
CO2 SERPL-SCNC: 18 MMOL/L (ref 20–33)
CO2 SERPL-SCNC: 18 MMOL/L (ref 20–33)
CO2 SERPL-SCNC: 25 MMOL/L (ref 20–33)
CO2 SERPL-SCNC: 26 MMOL/L (ref 20–33)
CO2 SERPL-SCNC: 28 MMOL/L (ref 20–33)
COLOR UR: YELLOW
CREAT SERPL-MCNC: 0.8 MG/DL (ref 0.5–1.4)
CREAT SERPL-MCNC: 0.81 MG/DL (ref 0.5–1.4)
CREAT SERPL-MCNC: 0.85 MG/DL (ref 0.5–1.4)
CREAT SERPL-MCNC: 0.94 MG/DL (ref 0.5–1.4)
CREAT SERPL-MCNC: 0.95 MG/DL (ref 0.5–1.4)
EPITHELIAL CELLS 1715: NORMAL /HPF (ref 0–5)
ERYTHROCYTE [DISTWIDTH] IN BLOOD BY AUTOMATED COUNT: 47.2 FL (ref 35.9–50)
ERYTHROCYTE [DISTWIDTH] IN BLOOD BY AUTOMATED COUNT: 49.7 FL (ref 35.9–50)
GFR SERPLBLD CREATININE-BSD FMLA CKD-EPI: 112 ML/MIN/1.73 M 2
GFR SERPLBLD CREATININE-BSD FMLA CKD-EPI: 114 ML/MIN/1.73 M 2
GFR SERPLBLD CREATININE-BSD FMLA CKD-EPI: 122 ML/MIN/1.73 M 2
GFR SERPLBLD CREATININE-BSD FMLA CKD-EPI: 124 ML/MIN/1.73 M 2
GFR SERPLBLD CREATININE-BSD FMLA CKD-EPI: 124 ML/MIN/1.73 M 2
GLUCOSE BLD STRIP.AUTO-MCNC: 116 MG/DL (ref 65–99)
GLUCOSE BLD STRIP.AUTO-MCNC: 120 MG/DL (ref 65–99)
GLUCOSE BLD STRIP.AUTO-MCNC: 135 MG/DL (ref 65–99)
GLUCOSE BLD STRIP.AUTO-MCNC: 152 MG/DL (ref 65–99)
GLUCOSE BLD STRIP.AUTO-MCNC: 160 MG/DL (ref 65–99)
GLUCOSE BLD STRIP.AUTO-MCNC: 163 MG/DL (ref 65–99)
GLUCOSE BLD STRIP.AUTO-MCNC: 167 MG/DL (ref 65–99)
GLUCOSE BLD STRIP.AUTO-MCNC: 188 MG/DL (ref 65–99)
GLUCOSE BLD STRIP.AUTO-MCNC: 213 MG/DL (ref 65–99)
GLUCOSE BLD STRIP.AUTO-MCNC: 215 MG/DL (ref 65–99)
GLUCOSE BLD STRIP.AUTO-MCNC: 215 MG/DL (ref 65–99)
GLUCOSE BLD STRIP.AUTO-MCNC: 236 MG/DL (ref 65–99)
GLUCOSE BLD STRIP.AUTO-MCNC: 238 MG/DL (ref 65–99)
GLUCOSE BLD STRIP.AUTO-MCNC: 89 MG/DL (ref 65–99)
GLUCOSE BLD STRIP.AUTO-MCNC: 90 MG/DL (ref 65–99)
GLUCOSE BLD STRIP.AUTO-MCNC: 98 MG/DL (ref 65–99)
GLUCOSE BLD STRIP.AUTO-MCNC: 99 MG/DL (ref 65–99)
GLUCOSE SERPL-MCNC: 115 MG/DL (ref 65–99)
GLUCOSE SERPL-MCNC: 161 MG/DL (ref 65–99)
GLUCOSE SERPL-MCNC: 178 MG/DL (ref 65–99)
GLUCOSE SERPL-MCNC: 251 MG/DL (ref 65–99)
GLUCOSE SERPL-MCNC: 98 MG/DL (ref 65–99)
GLUCOSE UR STRIP.AUTO-MCNC: 100 MG/DL
HCO3 BLDV-SCNC: 17 MMOL/L (ref 22–29)
HCT VFR BLD AUTO: 42.1 % (ref 42–52)
HCT VFR BLD AUTO: 48.1 % (ref 42–52)
HGB BLD-MCNC: 14.6 G/DL (ref 14–18)
HGB BLD-MCNC: 16.7 G/DL (ref 14–18)
INHALED O2 FLOW RATE: ABNORMAL L/MIN
KETONES UR STRIP.AUTO-MCNC: 40 MG/DL
LACTATE SERPL-SCNC: 1.3 MMOL/L (ref 0.5–2)
LEUKOCYTE ESTERASE UR QL STRIP.AUTO: NEGATIVE
MAGNESIUM SERPL-MCNC: 1.4 MG/DL (ref 1.5–2.5)
MAGNESIUM SERPL-MCNC: 1.6 MG/DL (ref 1.5–2.5)
MAGNESIUM SERPL-MCNC: 1.8 MG/DL (ref 1.5–2.5)
MCH RBC QN AUTO: 31.7 PG (ref 27–33)
MCH RBC QN AUTO: 32.1 PG (ref 27–33)
MCHC RBC AUTO-ENTMCNC: 34.7 G/DL (ref 32.3–36.5)
MCHC RBC AUTO-ENTMCNC: 34.7 G/DL (ref 32.3–36.5)
MCV RBC AUTO: 91.5 FL (ref 81.4–97.8)
MCV RBC AUTO: 92.5 FL (ref 81.4–97.8)
MICRO URNS: ABNORMAL
MUCOUS THREADS URNS QL MICRO: PRESENT /HPF
NITRITE UR QL STRIP.AUTO: NEGATIVE
PCO2 BLDV: 32.7 MMHG (ref 38–54)
PCO2 TEMP ADJ BLDV: 32.6 MMHG
PH BLDV: 7.33 [PH] (ref 7.31–7.45)
PH TEMP ADJ BLDV: 7.33 [PH] (ref 7.31–7.45)
PH UR STRIP.AUTO: 6 [PH] (ref 5–8)
PHOSPHATE SERPL-MCNC: 0.9 MG/DL (ref 2.5–4.5)
PHOSPHATE SERPL-MCNC: 1.5 MG/DL (ref 2.5–4.5)
PHOSPHATE SERPL-MCNC: 3.6 MG/DL (ref 2.5–4.5)
PLATELET # BLD AUTO: 206 K/UL (ref 164–446)
PLATELET # BLD AUTO: 240 K/UL (ref 164–446)
PMV BLD AUTO: 11.3 FL (ref 9–12.9)
PMV BLD AUTO: 12 FL (ref 9–12.9)
PO2 BLDV: 83.9 MMHG (ref 23–48)
PO2 TEMP ADJ BLDV: 83.4 MMHG (ref 23–48)
POTASSIUM SERPL-SCNC: 3.4 MMOL/L (ref 3.6–5.5)
POTASSIUM SERPL-SCNC: 3.8 MMOL/L (ref 3.6–5.5)
POTASSIUM SERPL-SCNC: 3.8 MMOL/L (ref 3.6–5.5)
POTASSIUM SERPL-SCNC: 4.2 MMOL/L (ref 3.6–5.5)
POTASSIUM SERPL-SCNC: 4.3 MMOL/L (ref 3.6–5.5)
PROT UR QL STRIP: NEGATIVE MG/DL
RBC # BLD AUTO: 4.6 M/UL (ref 4.7–6.1)
RBC # BLD AUTO: 5.2 M/UL (ref 4.7–6.1)
RBC # URNS HPF: NORMAL /HPF
RBC UR QL AUTO: ABNORMAL
SAO2 % BLDV: 95.1 % (ref 60–85)
SODIUM SERPL-SCNC: 139 MMOL/L (ref 135–145)
SODIUM SERPL-SCNC: 140 MMOL/L (ref 135–145)
SODIUM SERPL-SCNC: 142 MMOL/L (ref 135–145)
SP GR UR STRIP.AUTO: 1.02
TROPONIN T SERPL-MCNC: 44 NG/L (ref 6–19)
WBC # BLD AUTO: 11.5 K/UL (ref 4.8–10.8)
WBC # BLD AUTO: 19.5 K/UL (ref 4.8–10.8)
WBC #/AREA URNS HPF: NORMAL /HPF

## 2025-01-24 PROCEDURE — 770022 HCHG ROOM/CARE - ICU (200)

## 2025-01-24 PROCEDURE — 700105 HCHG RX REV CODE 258: Performed by: INTERNAL MEDICINE

## 2025-01-24 PROCEDURE — 84484 ASSAY OF TROPONIN QUANT: CPT

## 2025-01-24 PROCEDURE — 83605 ASSAY OF LACTIC ACID: CPT

## 2025-01-24 PROCEDURE — 94760 N-INVAS EAR/PLS OXIMETRY 1: CPT

## 2025-01-24 PROCEDURE — 87040 BLOOD CULTURE FOR BACTERIA: CPT | Mod: 91

## 2025-01-24 PROCEDURE — A9270 NON-COVERED ITEM OR SERVICE: HCPCS | Performed by: HOSPITALIST

## 2025-01-24 PROCEDURE — 700111 HCHG RX REV CODE 636 W/ 250 OVERRIDE (IP): Mod: JZ | Performed by: HOSPITALIST

## 2025-01-24 PROCEDURE — 700102 HCHG RX REV CODE 250 W/ 637 OVERRIDE(OP): Mod: JZ | Performed by: STUDENT IN AN ORGANIZED HEALTH CARE EDUCATION/TRAINING PROGRAM

## 2025-01-24 PROCEDURE — 700101 HCHG RX REV CODE 250: Performed by: INTERNAL MEDICINE

## 2025-01-24 PROCEDURE — 82962 GLUCOSE BLOOD TEST: CPT | Mod: 91

## 2025-01-24 PROCEDURE — 700111 HCHG RX REV CODE 636 W/ 250 OVERRIDE (IP): Performed by: HOSPITALIST

## 2025-01-24 PROCEDURE — 700105 HCHG RX REV CODE 258: Performed by: HOSPITALIST

## 2025-01-24 PROCEDURE — 82803 BLOOD GASES ANY COMBINATION: CPT

## 2025-01-24 PROCEDURE — A9270 NON-COVERED ITEM OR SERVICE: HCPCS | Performed by: INTERNAL MEDICINE

## 2025-01-24 PROCEDURE — 81001 URINALYSIS AUTO W/SCOPE: CPT

## 2025-01-24 PROCEDURE — 700102 HCHG RX REV CODE 250 W/ 637 OVERRIDE(OP): Performed by: HOSPITALIST

## 2025-01-24 PROCEDURE — 71045 X-RAY EXAM CHEST 1 VIEW: CPT

## 2025-01-24 PROCEDURE — 700111 HCHG RX REV CODE 636 W/ 250 OVERRIDE (IP): Performed by: INTERNAL MEDICINE

## 2025-01-24 PROCEDURE — 82010 KETONE BODYS QUAN: CPT | Mod: 91

## 2025-01-24 PROCEDURE — 99291 CRITICAL CARE FIRST HOUR: CPT | Performed by: INTERNAL MEDICINE

## 2025-01-24 PROCEDURE — 51798 US URINE CAPACITY MEASURE: CPT

## 2025-01-24 PROCEDURE — 84100 ASSAY OF PHOSPHORUS: CPT | Mod: 91

## 2025-01-24 PROCEDURE — 700102 HCHG RX REV CODE 250 W/ 637 OVERRIDE(OP): Performed by: INTERNAL MEDICINE

## 2025-01-24 PROCEDURE — 80048 BASIC METABOLIC PNL TOTAL CA: CPT | Mod: 91

## 2025-01-24 PROCEDURE — A9270 NON-COVERED ITEM OR SERVICE: HCPCS | Mod: JZ | Performed by: STUDENT IN AN ORGANIZED HEALTH CARE EDUCATION/TRAINING PROGRAM

## 2025-01-24 PROCEDURE — 85027 COMPLETE CBC AUTOMATED: CPT

## 2025-01-24 PROCEDURE — 36415 COLL VENOUS BLD VENIPUNCTURE: CPT

## 2025-01-24 PROCEDURE — 83735 ASSAY OF MAGNESIUM: CPT | Mod: 91

## 2025-01-24 RX ORDER — GABAPENTIN 300 MG/1
300 CAPSULE ORAL 3 TIMES DAILY
Status: DISCONTINUED | OUTPATIENT
Start: 2025-01-24 | End: 2025-01-25 | Stop reason: HOSPADM

## 2025-01-24 RX ORDER — THIAMINE HYDROCHLORIDE 100 MG/ML
200 INJECTION, SOLUTION INTRAMUSCULAR; INTRAVENOUS DAILY
Status: DISCONTINUED | OUTPATIENT
Start: 2025-01-24 | End: 2025-01-25 | Stop reason: HOSPADM

## 2025-01-24 RX ORDER — POTASSIUM CHLORIDE 1500 MG/1
40 TABLET, EXTENDED RELEASE ORAL ONCE
Status: COMPLETED | OUTPATIENT
Start: 2025-01-24 | End: 2025-01-24

## 2025-01-24 RX ORDER — SODIUM CHLORIDE, SODIUM LACTATE, POTASSIUM CHLORIDE, AND CALCIUM CHLORIDE .6; .31; .03; .02 G/100ML; G/100ML; G/100ML; G/100ML
2000 INJECTION, SOLUTION INTRAVENOUS ONCE
Status: COMPLETED | OUTPATIENT
Start: 2025-01-24 | End: 2025-01-24

## 2025-01-24 RX ORDER — DEXTROSE, SODIUM CHLORIDE, SODIUM LACTATE, POTASSIUM CHLORIDE, AND CALCIUM CHLORIDE 5; .6; .31; .03; .02 G/100ML; G/100ML; G/100ML; G/100ML; G/100ML
INJECTION, SOLUTION INTRAVENOUS CONTINUOUS
Status: DISCONTINUED | OUTPATIENT
Start: 2025-01-24 | End: 2025-01-24

## 2025-01-24 RX ORDER — POTASSIUM CHLORIDE 7.45 MG/ML
10 INJECTION INTRAVENOUS
Status: COMPLETED | OUTPATIENT
Start: 2025-01-24 | End: 2025-01-24

## 2025-01-24 RX ORDER — MAGNESIUM SULFATE HEPTAHYDRATE 40 MG/ML
2 INJECTION, SOLUTION INTRAVENOUS
Status: COMPLETED | OUTPATIENT
Start: 2025-01-24 | End: 2025-01-24

## 2025-01-24 RX ORDER — FOLIC ACID 1 MG/1
1 TABLET ORAL DAILY
Status: DISCONTINUED | OUTPATIENT
Start: 2025-01-24 | End: 2025-01-25 | Stop reason: HOSPADM

## 2025-01-24 RX ORDER — SODIUM CHLORIDE, SODIUM LACTATE, POTASSIUM CHLORIDE, CALCIUM CHLORIDE 600; 310; 30; 20 MG/100ML; MG/100ML; MG/100ML; MG/100ML
INJECTION, SOLUTION INTRAVENOUS CONTINUOUS
Status: DISCONTINUED | OUTPATIENT
Start: 2025-01-24 | End: 2025-01-25

## 2025-01-24 RX ORDER — DEXTROSE MONOHYDRATE AND SODIUM CHLORIDE 5; .45 G/100ML; G/100ML
INJECTION, SOLUTION INTRAVENOUS CONTINUOUS
Status: DISCONTINUED | OUTPATIENT
Start: 2025-01-24 | End: 2025-01-24

## 2025-01-24 RX ORDER — SODIUM CHLORIDE, SODIUM LACTATE, POTASSIUM CHLORIDE, AND CALCIUM CHLORIDE .6; .31; .03; .02 G/100ML; G/100ML; G/100ML; G/100ML
1000 INJECTION, SOLUTION INTRAVENOUS ONCE
Status: COMPLETED | OUTPATIENT
Start: 2025-01-24 | End: 2025-01-24

## 2025-01-24 RX ORDER — MAGNESIUM SULFATE HEPTAHYDRATE 40 MG/ML
2 INJECTION, SOLUTION INTRAVENOUS ONCE
Status: COMPLETED | OUTPATIENT
Start: 2025-01-24 | End: 2025-01-24

## 2025-01-24 RX ORDER — SODIUM CHLORIDE 9 MG/ML
1000 INJECTION, SOLUTION INTRAVENOUS ONCE
Status: DISCONTINUED | OUTPATIENT
Start: 2025-01-24 | End: 2025-01-24

## 2025-01-24 RX ORDER — POTASSIUM CHLORIDE 7.45 MG/ML
10 INJECTION INTRAVENOUS
Status: COMPLETED | OUTPATIENT
Start: 2025-01-25 | End: 2025-01-25

## 2025-01-24 RX ORDER — GAUZE BANDAGE 2" X 2"
100 BANDAGE TOPICAL DAILY
Status: DISCONTINUED | OUTPATIENT
Start: 2025-01-24 | End: 2025-01-24

## 2025-01-24 RX ORDER — SODIUM CHLORIDE, SODIUM LACTATE, POTASSIUM CHLORIDE, AND CALCIUM CHLORIDE .6; .31; .03; .02 G/100ML; G/100ML; G/100ML; G/100ML
2000 INJECTION, SOLUTION INTRAVENOUS ONCE
Status: DISCONTINUED | OUTPATIENT
Start: 2025-01-24 | End: 2025-01-24

## 2025-01-24 RX ORDER — DEXTROSE AND SODIUM CHLORIDE 10; .45 G/100ML; G/100ML
INJECTION, SOLUTION INTRAVENOUS CONTINUOUS
Status: ACTIVE | OUTPATIENT
Start: 2025-01-24 | End: 2025-01-25

## 2025-01-24 RX ORDER — MAGNESIUM SULFATE HEPTAHYDRATE 40 MG/ML
4 INJECTION, SOLUTION INTRAVENOUS
Status: COMPLETED | OUTPATIENT
Start: 2025-01-24 | End: 2025-01-24

## 2025-01-24 RX ADMIN — INSULIN HUMAN 4 UNITS/HR: 1 INJECTION, SOLUTION INTRAVENOUS at 08:23

## 2025-01-24 RX ADMIN — CHLORDIAZEPOXIDE HYDROCHLORIDE 50 MG: 25 CAPSULE ORAL at 02:24

## 2025-01-24 RX ADMIN — POTASSIUM CHLORIDE 40 MEQ: 1500 TABLET, EXTENDED RELEASE ORAL at 19:23

## 2025-01-24 RX ADMIN — PANTOPRAZOLE SODIUM 40 MG: 40 INJECTION, POWDER, FOR SOLUTION INTRAVENOUS at 05:31

## 2025-01-24 RX ADMIN — INSULIN LISPRO 3 UNITS: 100 INJECTION, SOLUTION INTRAVENOUS; SUBCUTANEOUS at 06:13

## 2025-01-24 RX ADMIN — THERA TABS 1 TABLET: TAB at 08:08

## 2025-01-24 RX ADMIN — DEXTROSE AND SODIUM CHLORIDE: 10; .45 INJECTION, SOLUTION INTRAVENOUS at 13:49

## 2025-01-24 RX ADMIN — GABAPENTIN 900 MG: 300 CAPSULE ORAL at 05:31

## 2025-01-24 RX ADMIN — CHLORDIAZEPOXIDE HYDROCHLORIDE 50 MG: 25 CAPSULE ORAL at 16:04

## 2025-01-24 RX ADMIN — Medication 100 MG: at 08:08

## 2025-01-24 RX ADMIN — FOLIC ACID 1 MG: 1 TABLET ORAL at 08:08

## 2025-01-24 RX ADMIN — THERA TABS 1 TABLET: TAB at 05:31

## 2025-01-24 RX ADMIN — ONDANSETRON 4 MG: 2 INJECTION INTRAMUSCULAR; INTRAVENOUS at 05:59

## 2025-01-24 RX ADMIN — SODIUM CHLORIDE, SODIUM LACTATE, POTASSIUM CHLORIDE, AND CALCIUM CHLORIDE 2000 ML: .6; .31; .03; .02 INJECTION, SOLUTION INTRAVENOUS at 06:28

## 2025-01-24 RX ADMIN — POTASSIUM CHLORIDE 10 MEQ: 7.46 INJECTION, SOLUTION INTRAVENOUS at 23:55

## 2025-01-24 RX ADMIN — ENOXAPARIN SODIUM 40 MG: 100 INJECTION SUBCUTANEOUS at 17:23

## 2025-01-24 RX ADMIN — FOLIC ACID 1 MG: 1 TABLET ORAL at 05:31

## 2025-01-24 RX ADMIN — SENNOSIDES AND DOCUSATE SODIUM 2 TABLET: 50; 8.6 TABLET ORAL at 17:23

## 2025-01-24 RX ADMIN — THIAMINE HYDROCHLORIDE 200 MG: 100 INJECTION, SOLUTION INTRAMUSCULAR; INTRAVENOUS at 11:46

## 2025-01-24 RX ADMIN — SODIUM CHLORIDE, SODIUM LACTATE, POTASSIUM CHLORIDE, AND CALCIUM CHLORIDE 1000 ML: .6; .31; .03; .02 INJECTION, SOLUTION INTRAVENOUS at 03:17

## 2025-01-24 RX ADMIN — OXYCODONE 5 MG: 5 TABLET ORAL at 19:38

## 2025-01-24 RX ADMIN — CHLORDIAZEPOXIDE HYDROCHLORIDE 50 MG: 25 CAPSULE ORAL at 08:08

## 2025-01-24 RX ADMIN — LORAZEPAM 2 MG: 2 INJECTION INTRAMUSCULAR; INTRAVENOUS at 06:02

## 2025-01-24 RX ADMIN — POTASSIUM CHLORIDE 10 MEQ: 7.46 INJECTION, SOLUTION INTRAVENOUS at 13:04

## 2025-01-24 RX ADMIN — GABAPENTIN 300 MG: 300 CAPSULE ORAL at 17:23

## 2025-01-24 RX ADMIN — CHLORDIAZEPOXIDE HYDROCHLORIDE 50 MG: 25 CAPSULE ORAL at 22:02

## 2025-01-24 RX ADMIN — MAGNESIUM SULFATE HEPTAHYDRATE 2 G: 2 INJECTION, SOLUTION INTRAVENOUS at 15:01

## 2025-01-24 RX ADMIN — MAGNESIUM SULFATE HEPTAHYDRATE 2 G: 2 INJECTION, SOLUTION INTRAVENOUS at 06:31

## 2025-01-24 RX ADMIN — DEXTROSE AND SODIUM CHLORIDE: 10; .45 INJECTION, SOLUTION INTRAVENOUS at 23:32

## 2025-01-24 RX ADMIN — POTASSIUM PHOSPHATE, MONOBASIC AND POTASSIUM PHOSPHATE, DIBASIC 30 MMOL: 224; 236 INJECTION, SOLUTION, CONCENTRATE INTRAVENOUS at 16:06

## 2025-01-24 RX ADMIN — GABAPENTIN 300 MG: 300 CAPSULE ORAL at 11:46

## 2025-01-24 RX ADMIN — DEXTROSE AND SODIUM CHLORIDE: 5; 450 INJECTION, SOLUTION INTRAVENOUS at 08:46

## 2025-01-24 RX ADMIN — POTASSIUM CHLORIDE 10 MEQ: 7.46 INJECTION, SOLUTION INTRAVENOUS at 11:54

## 2025-01-24 RX ADMIN — LORAZEPAM 2 MG: 2 INJECTION INTRAMUSCULAR; INTRAVENOUS at 01:52

## 2025-01-24 RX ADMIN — PANTOPRAZOLE SODIUM 40 MG: 40 INJECTION, POWDER, FOR SOLUTION INTRAVENOUS at 17:23

## 2025-01-24 RX ADMIN — Medication 100 MG: at 05:31

## 2025-01-24 ASSESSMENT — LIFESTYLE VARIABLES
VISUAL DISTURBANCES: NOT PRESENT
TOTAL SCORE: 4
HEADACHE, FULLNESS IN HEAD: NOT PRESENT
HEADACHE, FULLNESS IN HEAD: NOT PRESENT
TREMOR: TREMOR NOT VISIBLE BUT CAN BE FELT, FINGERTIP TO FINGERTIP
TREMOR: TREMOR NOT VISIBLE BUT CAN BE FELT, FINGERTIP TO FINGERTIP
VISUAL DISTURBANCES: NOT PRESENT
ANXIETY: *
ANXIETY: *
TOTAL SCORE: 7
HEADACHE, FULLNESS IN HEAD: VERY MILD
NAUSEA AND VOMITING: NO NAUSEA AND NO VOMITING
TOTAL SCORE: 7
AUDITORY DISTURBANCES: NOT PRESENT
PAROXYSMAL SWEATS: BARELY PERCEPTIBLE SWEATING. PALMS MOIST
ORIENTATION AND CLOUDING OF SENSORIUM: DATE DISORIENTATION BY NO MORE THAN TWO CALENDAR DAYS
ORIENTATION AND CLOUDING OF SENSORIUM: ORIENTED AND CAN DO SERIAL ADDITIONS
TREMOR: NO TREMOR
NAUSEA AND VOMITING: NO NAUSEA AND NO VOMITING
VISUAL DISTURBANCES: NOT PRESENT
TOTAL SCORE: VERY MILD ITCHING, PINS AND NEEDLES SENSATION, BURNING OR NUMBNESS
ANXIETY: *
PAROXYSMAL SWEATS: BARELY PERCEPTIBLE SWEATING. PALMS MOIST
ORIENTATION AND CLOUDING OF SENSORIUM: CANNOT DO SERIAL ADDITIONS OR IS UNCERTAIN ABOUT DATE
AUDITORY DISTURBANCES: NOT PRESENT
AUDITORY DISTURBANCES: NOT PRESENT
PAROXYSMAL SWEATS: NO SWEAT VISIBLE
AGITATION: SOMEWHAT MORE THAN NORMAL ACTIVITY
NAUSEA AND VOMITING: NO NAUSEA AND NO VOMITING
AGITATION: SOMEWHAT MORE THAN NORMAL ACTIVITY
AGITATION: NORMAL ACTIVITY

## 2025-01-24 ASSESSMENT — PAIN DESCRIPTION - PAIN TYPE
TYPE: ACUTE PAIN

## 2025-01-24 ASSESSMENT — FIBROSIS 4 INDEX: FIB4 SCORE: 0.56

## 2025-01-24 NOTE — PROGRESS NOTES
"Pulmonary Progress Note    Date of admission  1/21/2025    Chief Complaint  25 y.o. male admitted 1/21/2025 with GI bleed, alcohol withdrawal, subsequent DKA    Hospital Course  25 y.o. male who presented 1/21/2025 with alcohol withdrawal, metabolic acidosis.  On the floor he was scoring high on CIWA despite multiple doses of benzodiazepines also developing worsening anion gap acidosis concerning for DKA and was transferred to ICU for Precedex drip and insulin drip..  At time of admission he was reporting nausea and vomiting with bloody emesis.  He was started on an octreotide drip.  He drinks about a pint of Anand Parham per day also uses marijuana extensively     Underwent EGD demonstrating grade D esophagitis and portal hypertensive gastropathy.  Octreotide DC'd.  Continued PPI.  Started on clear liquid diet.    Interval Problem Update  Chart review from the past 24 hours includes imaging, laboratory studies, vital signs and notes available.  Pertinent data for today's visit includes    No acute events overnight  Anion gap/acidosis reopened- sugars controlled    Cardiac: ST, HR 120s, SBP 150s  Pulm: 2LPM  Neuro: impulsive, RASS -2 -> +2, continues on precedex gtt, librium , ativan PRN  Heme: persistent leukocytosis, Hb stable  Renal/volume status: renal function preserved, brisk UOP  ID:  afebrile, uptrending leukocytosis ? 2/2 stress, no abx. Flu/covid/rsv neg  GI/endo: diabetic diet  Labs/Imaging: reviewed  Lines: DAMEON Griffith    Review of Systems  Review of Systems   Unable to perform ROS: Mental acuity   Constitutional:         \"I want to go home\"      Vital Signs for last 24 hours   Temp:  [36.3 °C (97.4 °F)-37.1 °C (98.7 °F)] 36.8 °C (98.2 °F)  Pulse:  [] 126  Resp:  [18-40] 35  BP: ()/() 128/81  SpO2:  [89 %-97 %] 97 %    Physical Exam   Physical Exam  Vitals and nursing note reviewed.   Constitutional:       General: He is not in acute distress.     Appearance: He is well-developed. He is " ill-appearing. He is not diaphoretic.      Comments: Somnolent   HENT:      Nose: Nose normal.      Mouth/Throat:      Mouth: Mucous membranes are dry.      Pharynx: No oropharyngeal exudate.   Eyes:      General: No scleral icterus.        Right eye: No discharge.         Left eye: No discharge.      Conjunctiva/sclera: Conjunctivae normal.      Pupils: Pupils are equal, round, and reactive to light.   Neck:      Thyroid: No thyromegaly.      Vascular: No JVD.      Trachea: No tracheal deviation.   Cardiovascular:      Rate and Rhythm: Normal rate and regular rhythm.      Heart sounds: Normal heart sounds. No murmur heard.  Pulmonary:      Effort: Respiratory distress present.      Breath sounds: Normal breath sounds. No stridor. No wheezing or rales.   Abdominal:      General: There is no distension.      Palpations: Abdomen is soft.      Tenderness: There is no abdominal tenderness. There is no guarding.   Musculoskeletal:         General: No tenderness. Normal range of motion.      Cervical back: Neck supple.   Lymphadenopathy:      Cervical: No cervical adenopathy.   Skin:     General: Skin is warm and dry.      Capillary Refill: Capillary refill takes less than 2 seconds.      Coloration: Skin is pale.      Findings: Bruising present. No erythema.   Neurological:      Mental Status: He is lethargic and disoriented.      GCS: GCS eye subscore is 3. GCS verbal subscore is 4. GCS motor subscore is 6.      Sensory: No sensory deficit.      Motor: No abnormal muscle tone.      Coordination: Coordination normal.      Deep Tendon Reflexes: Reflexes normal.       Medications  Current Facility-Administered Medications   Medication Dose Route Frequency Provider Last Rate Last Admin    dexmedetomidine (Precedex) 400 mcg/100mL infusion  0.1-1.5 mcg/kg/hr (Ideal) Intravenous Continuous Gaby Bates M.D.   Stopped at 01/23/25 1700    LORazepam (Ativan) injection 4 mg  4 mg Intravenous Q15 MIN PRN Gaby  NAHID Bates        Or    LORazepam (Ativan) injection 3 mg  3 mg Intravenous Q15 MIN PRN Gaby Bates M.D.        Or    LORazepam (Ativan) injection 2 mg  2 mg Intravenous Q15 MIN PRN Gaby Bates M.D.   2 mg at 01/24/25 0152    Or    LORazepam (Ativan) injection 1 mg  1 mg Intravenous Q15 MIN PRN Gaby Bates M.D. MD ALERT-PHARMACY TO CONSULT FOR DKA MONITORING 1 Each  1 Each Other PRN Gaby Bates M.D.        potassium phosphate 30 mmol in  mL ivpb  30 mmol Intravenous Once PRN Gaby Bates M.D.        Or    sodium phosphate 30 mmol in 1/2  mL ivpb  30 mmol Intravenous Once PRN Gaby Bates M.D.        chlordiazePOXIDE (Librium) capsule 50 mg  50 mg Oral Q6HRS Aaron Phillips M.D.   50 mg at 01/24/25 0224    insulin GLARGINE (Lantus,Semglee) injection  15 Units Subcutaneous Q EVENING Allan Garnica M.D.   15 Units at 01/23/25 2000    insulin lispro (HumaLOG,AdmeLOG) subcutaneous injection  2-9 Units Subcutaneous 4X/DAY OLIVERS Allan Garnica M.D.        And    dextrose 50% (D50W) injection 25 g  25 g Intravenous Q15 MIN PRN Allan Garnica M.D.        pantoprazole (Protonix) injection 40 mg  40 mg Intravenous BID Valeria Martinez D.O.   40 mg at 01/24/25 0531    gabapentin (Neurontin) capsule 900 mg  900 mg Oral TID Valeria Martinez D.O.   900 mg at 01/24/25 0531    Respiratory Therapy Consult   Nebulization Continuous RT Madeline Mccrary M.D.        enoxaparin (Lovenox) inj 40 mg  40 mg Subcutaneous DAILY AT 1800 Madeline Mccrary M.D.   40 mg at 01/23/25 1708    acetaminophen (Tylenol) tablet 650 mg  650 mg Oral Q6HRS PRN Levente Levai, M.D.        Pharmacy Consult Request ...Pain Management Review 1 Each  1 Each Other PHARMACY TO DOSE Madeline Mccrary M.D.        oxyCODONE immediate-release (Roxicodone) tablet 2.5 mg  2.5 mg Oral Q3HRS PRN Madeline Mccrary M.D.        Or    oxyCODONE immediate-release (Roxicodone) tablet 5 mg  5 mg  Oral Q3HRS PRN Madeline Mccrary M.D.        Or    morphine 4 MG/ML injection 2 mg  2 mg Intravenous Q3HRS PRN Madeline Mccrary M.D.        labetalol (Normodyne/Trandate) injection 10 mg  10 mg Intravenous Q4HRS PRN Madeline Mccrary M.D.        cloNIDine (Catapres) tablet 0.1 mg  0.1 mg Oral Q HOUR PRN Madeline Mccrary M.D.        ondansetron (Zofran) syringe/vial injection 4 mg  4 mg Intravenous Q4HRS PRN Madeline Mccrary M.D.   4 mg at 01/21/25 2014    ondansetron (Zofran ODT) dispertab 4 mg  4 mg Oral Q4HRS PRN Madeline Mccrary M.D.        promethazine (Phenergan) tablet 12.5-25 mg  12.5-25 mg Oral Q4HRS PRN Madeline Mccrary M.D.        promethazine (Phenergan) suppository 12.5-25 mg  12.5-25 mg Rectal Q4HRS PRN Madeline Mccrary M.D.        prochlorperazine (Compazine) injection 5-10 mg  5-10 mg Intravenous Q4HRS PRN Madeline Mccrary M.D.   10 mg at 01/22/25 0240    thiamine (Vitamin B-1) tablet 100 mg  100 mg Oral DAILY Madeline Mccrary M.D.   100 mg at 01/24/25 0531    And    multivitamin tablet 1 Tablet  1 Tablet Oral DAILY Madeline Mccrary M.D.   1 Tablet at 01/24/25 0531    And    folic acid (Folvite) tablet 1 mg  1 mg Oral DAILY MONY LivingstonDCarlos   1 mg at 01/24/25 0531    senna-docusate (Pericolace Or Senokot S) 8.6-50 MG per tablet 2 Tablet  2 Tablet Oral Q EVENING Madeline Mccrary M.D.        And    polyethylene glycol/lytes (Miralax) Packet 1 Packet  1 Packet Oral QDAY PRN Madeline Mccrary M.D.        haloperidol lactate (Haldol) injection 5 mg  5 mg Intravenous Q4HRS PRN Madeline Mccrary M.D.         Fluids    Intake/Output Summary (Last 24 hours) at 1/24/2025 0559  Last data filed at 1/24/2025 0330  Gross per 24 hour   Intake 2211.34 ml   Output 4000 ml   Net -1788.66 ml     Laboratory  Recent Labs     01/21/25  1313 01/23/25  0536   E1UHKHMCL NA  --    ISTATTEMP  --  97.2 F   ISTATSPEC  --  Venous         Recent Labs     01/23/25  1400 01/23/25  1804 01/23/25  2143 01/24/25  0200   SODIUM 138 140 139 140   POTASSIUM 4.1 3.8  4.2 4.3   CHLORIDE 104 107 103 102   CO2 18* 23 19* 18*   BUN 18 16 13 12   CREATININE 1.23 1.06 1.02 0.95   MAGNESIUM 1.6  --  2.0 1.8   PHOSPHORUS 2.2*  --  1.4* 1.5*   CALCIUM 8.6 8.7 9.4 9.6     Recent Labs     01/21/25 1313 01/22/25  0702 01/23/25  0510 01/23/25  1008 01/23/25  1804 01/23/25 2143 01/24/25  0200   ALTSGPT 18  --  13  --   --   --   --    ASTSGOT 23  --  18  --   --   --   --    ALKPHOSPHAT 76  --  79  --   --   --   --    TBILIRUBIN 0.7  --  0.7  --   --   --   --    LIPASE 5*  --   --   --   --   --   --    GLUCOSE 126*   < > 294*   < > 101* 130* 161*    < > = values in this interval not displayed.     Recent Labs     01/21/25 1313 01/21/25 2141 01/23/25 0117 01/23/25 0510 01/24/25  0200   WBC 14.1*   < > 16.7* 18.4* 19.5*   NEUTSPOLYS 82.90*  --   --  86.00*  --    LYMPHOCYTES 10.20*  --   --  4.00*  --    MONOCYTES 5.60  --   --  10.00  --    EOSINOPHILS 0.50  --   --  0.00  --    BASOPHILS 0.40  --   --  0.00  --    ASTSGOT 23  --   --  18  --    ALTSGPT 18  --   --  13  --    ALKPHOSPHAT 76  --   --  79  --    TBILIRUBIN 0.7  --   --  0.7  --     < > = values in this interval not displayed.     Recent Labs     01/21/25 1313 01/21/25 2141 01/23/25 0117 01/23/25 0510 01/24/25  0200   RBC 5.24   < > 4.84 4.77 5.20   HEMOGLOBIN 16.5   < > 15.3 15.1 16.7   HEMATOCRIT 48.9   < > 45.0 45.5 48.1   PLATELETCT 239   < > 245 181 240   PROTHROMBTM 14.4  --   --   --   --    APTT 26.4  --   --   --   --    INR 1.08  --   --   --   --     < > = values in this interval not displayed.     Imaging  X-Ray:  No film today    Assessment/Plan    * DKA, type 1, not at goal (HCC)- (present on admission)  Assessment & Plan  Incited by alcohol  1/24: gap closed but rapidly reopened, AG uptrending, BOH > 8, compensated metabolic acidosis on ABG  Persistent ketosis from euglycemic DKA/alcoholic ketosis  Lactic acid WNL  Resume insulin drip, DKA protocol  Maintain insulin gtt until AG closes and HCO3 >18 AND  beta-hydroxybutyrate negative  Start thiamine IV  Start diabetic diet when DKA closes closely monitoring for refeeding syndrome  HgA1C 7.6%  Closely monitor on telemetry  NPO    Alcohol withdrawal syndrome with complication (HCC)- (present on admission)  Assessment & Plan  alcohol withdrawal with delirium no seizures  Cont librium, precedex gtt, ativan ladder per RASS/EtOH withdrawal protocol  Start thiamine IV  ICU electrolyte repletion protocol  Seizure precautions  Protecting airway adequately      GI bleed  Assessment & Plan  Upper GI bleed attributed to grade 4 esophagitis likely related to alcoholism  Continue PPI twice daily  DC octreotide  Trend CBC  Resume DVT prophylaxis      Hypophosphatemia- (present on admission)  Assessment & Plan  replete    Hypomagnesemia- (present on admission)  Assessment & Plan  replete    SIRS (systemic inflammatory response syndrome) (HCC)- (present on admission)  Assessment & Plan  SIRS criteria identified on my evaluation include:  Tachycardia, with heart rate greater than 90 BPM and Leukocytosis, with WBC greater than 12,000  SIRS is non-infectious, the patient does not have sepsis  Check CXR, UA, blood cultures  Closely monitor off ABX    Marijuana user- (present on admission)  Assessment & Plan  Heavy marijuana use, risk for CVS/withdrawal  montior    Demand ischemia of myocardium (HCC)  Assessment & Plan  Due to DKA, alcohol withdrawal  ECG with ST, repolarization, no ischemic changes  Trend cardiac enzymes, trend telemetry       VTE:  Lovenox  Ulcer: PPI  Lines: None    I have performed a physical exam and reviewed and updated ROS and Plan today (1/24/2025). In review of yesterday's note (1/23/2025), there are no changes except as documented above.     Discussed patient condition and risk of morbidity and/or mortality with RN, RT, Pharmacy, Charge nurse / hot rounds, and Patient    The patient remains critically ill.  Critical care time = 55 minutes in directly providing  and coordinating critical care and extensive data review.  No time overlap and excludes procedures.    This note was generated using voice recognition software which has a chance of producing errors of grammar and content.  I have made every reasonable attempt to find and correct any errors, but it should be expected that some may not be found prior to finalization of this note.  __________  Aaron Phillips MD  Pulmonary and Critical Care Medicine  [facility]

## 2025-01-24 NOTE — ASSESSMENT & PLAN NOTE
SIRS criteria identified on my evaluation include:  Tachycardia, with heart rate greater than 90 BPM and Leukocytosis, with WBC greater than 12,000  SIRS is non-infectious, the patient does not have sepsis  Check CXR, UA, blood cultures  Closely monitor off ABX

## 2025-01-24 NOTE — CARE PLAN
The patient is Stable - Low risk of patient condition declining or worsening    Shift Goals  Clinical Goals: CIWA monitoring / Insulin Drip  Patient Goals: Go Home  Family Goals: NICOLE    Progress made toward(s) clinical / shift goals:  Pt on insulin drip. Pt CIWAS 5-10. Vitals within normal limits.    Patient is not progressing towards the following goals:

## 2025-01-24 NOTE — ASSESSMENT & PLAN NOTE
alcohol withdrawal with delirium no seizures  Cont librium, precedex gtt, ativan ladder per RASS/EtOH withdrawal protocol  Start thiamine IV  ICU electrolyte repletion protocol  Seizure precautions  Protecting airway adequately

## 2025-01-24 NOTE — CONSULTS
Critical Care Consultation    Date of consult: 1/23/2025    Referring Physician  Gaby Bates M.D.     Reason for Consultation  Transfer to ICU for higher level of care in setting of DKA, alcohol withdrawal    History of Presenting Illness  25 y.o. male who presented 1/21/2025 with alcohol withdrawal, metabolic acidosis.  On the floor he was scoring high on CIWA despite multiple doses of benzodiazepines also developing worsening anion gap acidosis concerning for DKA and was transferred to ICU for Precedex drip and insulin drip..  At time of admission he was reporting nausea and vomiting with bloody emesis.  He was started on an octreotide drip.  He drinks about a pint of Anand Parham per day also uses marijuana extensively    Underwent EGD demonstrating grade D esophagitis and portal hypertensive gastropathy.  Octreotide DC'd.  Continued PPI.  Started on clear liquid diet.    Code Status  Full Code    Review of Systems  Review of Systems   Unable to perform ROS: Mental acuity       Past Medical History   has a past medical history of Diabetes (HCC).    Surgical History   has a past surgical history that includes pr upper gi endoscopy,diagnosis (N/A, 1/22/2025).    Family History  family history is not on file.    Social History   reports that he has been smoking. He has quit using smokeless tobacco. He reports that he does not currently use alcohol. He reports that he does not currently use drugs.    Medications  Home Medications       Reviewed by Madeline Mccrary M.D. (Physician) on 01/21/25 at 1410  Med List Status: Complete     Medication Last Dose Status   Acetaminophen (TYLENOL PO) 1/19/2025 Active   insulin glargine (LANTUS) 100 UNIT/ML Solution Not Taking Active   insulin infusion pump Device  Active   insulin lispro (HUMALOG) 100 UNIT/ML Not Taking Active                  Audit from Redirected Encounters    **Home medications have not yet been reviewed for this encounter**       Current  Facility-Administered Medications   Medication Dose Route Frequency Provider Last Rate Last Admin    dexmedetomidine (Precedex) 400 mcg/100mL infusion  0.1-1.5 mcg/kg/hr (Ideal) Intravenous Continuous Gaby Bates M.D.   Stopped at 01/23/25 1700    LORazepam (Ativan) injection 4 mg  4 mg Intravenous Q15 MIN PRN Gaby Bates M.D.        Or    LORazepam (Ativan) injection 3 mg  3 mg Intravenous Q15 MIN PRN Gaby Bates M.D.        Or    LORazepam (Ativan) injection 2 mg  2 mg Intravenous Q15 MIN PRN Gaby Bates M.D.   2 mg at 01/23/25 0635    Or    LORazepam (Ativan) injection 1 mg  1 mg Intravenous Q15 MIN PRN Gaby Bates M.D. MD ALERT-PHARMACY TO CONSULT FOR DKA MONITORING 1 Each  1 Each Other PRN Gaby Bates M.D.        potassium phosphate 30 mmol in  mL ivpb  30 mmol Intravenous Once PRN Gaby Bates M.D.        Or    sodium phosphate 30 mmol in 1/2  mL ivpb  30 mmol Intravenous Once PRN Gaby Bates M.D.        chlordiazePOXIDE (Librium) capsule 50 mg  50 mg Oral Q6HRS Aaron Phillips M.D.   50 mg at 01/23/25 1633    potassium chloride (KCL) ivpb 10 mEq  10 mEq Intravenous Q HOUR Aaron Phillips M.D. 100 mL/hr at 01/23/25 1840 10 mEq at 01/23/25 1840    potassium chloride SA (Kdur) tablet 20 mEq  20 mEq Oral Once Allan Garnica M.D.        insulin GLARGINE (Lantus,Semglee) injection  15 Units Subcutaneous Q EVENING Allan Garnica M.D.        insulin lispro (HumaLOG,AdmeLOG) subcutaneous injection  2-9 Units Subcutaneous 4X/DAY ACHS Allan Garnica M.D.        And    dextrose 50% (D50W) injection 25 g  25 g Intravenous Q15 MIN PRN Allan Garnica M.D.        pantoprazole (Protonix) injection 40 mg  40 mg Intravenous BID KELLEY LoepzO.   40 mg at 01/23/25 6617    gabapentin (Neurontin) capsule 900 mg  900 mg Oral TID Valeria Martinez D.O.   900 mg at 01/23/25 9134    Respiratory Therapy Consult    Nebulization Continuous RT Madeline Mccrary M.D.        enoxaparin (Lovenox) inj 40 mg  40 mg Subcutaneous DAILY AT 1800 Madeline Mccrary M.D.   40 mg at 01/23/25 1708    acetaminophen (Tylenol) tablet 650 mg  650 mg Oral Q6HRS PRN Madeline Mccrary M.D.        Pharmacy Consult Request ...Pain Management Review 1 Each  1 Each Other PHARMACY TO DOSE Madeline Mccrary M.D.        oxyCODONE immediate-release (Roxicodone) tablet 2.5 mg  2.5 mg Oral Q3HRS PRN Madeline Mccrary M.D.        Or    oxyCODONE immediate-release (Roxicodone) tablet 5 mg  5 mg Oral Q3HRS PRN Madeline Mccrary M.D.        Or    morphine 4 MG/ML injection 2 mg  2 mg Intravenous Q3HRS PRN Madeline Mccrary M.D.        labetalol (Normodyne/Trandate) injection 10 mg  10 mg Intravenous Q4HRS PRN Madeline Mccrary M.D.        cloNIDine (Catapres) tablet 0.1 mg  0.1 mg Oral Q HOUR PRN Madeline Mccrary M.D.        ondansetron (Zofran) syringe/vial injection 4 mg  4 mg Intravenous Q4HRS PRN Madeline Mccrary M.D.   4 mg at 01/21/25 2014    ondansetron (Zofran ODT) dispertab 4 mg  4 mg Oral Q4HRS PRN Madeline Mccrary M.D.        promethazine (Phenergan) tablet 12.5-25 mg  12.5-25 mg Oral Q4HRS PRN Madeline Mccrary M.D.        promethazine (Phenergan) suppository 12.5-25 mg  12.5-25 mg Rectal Q4HRS PRN Madeline Mccrary M.D.        prochlorperazine (Compazine) injection 5-10 mg  5-10 mg Intravenous Q4HRS PRN Madeline Mccrary M.D.   10 mg at 01/22/25 0240    thiamine (Vitamin B-1) tablet 100 mg  100 mg Oral DAILY Madeline Mccrary M.D.   100 mg at 01/23/25 0542    And    multivitamin tablet 1 Tablet  1 Tablet Oral DAILY Madeline Mccrary M.D.   1 Tablet at 01/23/25 0541    And    folic acid (Folvite) tablet 1 mg  1 mg Oral DAILY Madeline Mccrary M.D.   1 mg at 01/23/25 0542    senna-docusate (Pericolace Or Senokot S) 8.6-50 MG per tablet 2 Tablet  2 Tablet Oral Q EVENING Madeline Mccrary M.D.        And    polyethylene glycol/lytes (Miralax) Packet 1 Packet  1 Packet Oral QDAY PRN Madeline Mccrary M.D.         haloperidol lactate (Haldol) injection 5 mg  5 mg Intravenous Q4HRS PRN Madeline Mccrary M.D.           Allergies  No Known Allergies    Vital Signs last 24 hours  Temp:  [36.3 °C (97.4 °F)-36.4 °C (97.5 °F)] 36.4 °C (97.5 °F)  Pulse:  [] 112  Resp:  [18-36] 34  BP: (100-162)/() 138/87  SpO2:  [91 %-99 %] 91 %    Physical Exam  Physical Exam  Vitals and nursing note reviewed.   Constitutional:       General: He is not in acute distress.     Appearance: He is well-developed. He is ill-appearing. He is not diaphoretic.      Comments: Somnolent   HENT:      Nose: Nose normal.      Mouth/Throat:      Pharynx: No oropharyngeal exudate.   Eyes:      General: No scleral icterus.        Right eye: No discharge.         Left eye: No discharge.      Conjunctiva/sclera: Conjunctivae normal.      Pupils: Pupils are equal, round, and reactive to light.   Neck:      Thyroid: No thyromegaly.      Vascular: No JVD.      Trachea: No tracheal deviation.   Cardiovascular:      Rate and Rhythm: Normal rate and regular rhythm.      Heart sounds: Normal heart sounds. No murmur heard.  Pulmonary:      Effort: Respiratory distress present.      Breath sounds: Normal breath sounds. No stridor. No wheezing or rales.   Abdominal:      General: There is no distension.      Palpations: Abdomen is soft.      Tenderness: There is no abdominal tenderness. There is no guarding.   Musculoskeletal:         General: No tenderness. Normal range of motion.      Cervical back: Neck supple.   Lymphadenopathy:      Cervical: No cervical adenopathy.   Skin:     General: Skin is warm and dry.      Capillary Refill: Capillary refill takes less than 2 seconds.      Coloration: Skin is pale.      Findings: Bruising present. No erythema.   Neurological:      Mental Status: He is lethargic and disoriented.      GCS: GCS eye subscore is 3. GCS verbal subscore is 4. GCS motor subscore is 6.      Sensory: No sensory deficit.      Motor: No abnormal  muscle tone.      Coordination: Coordination normal.      Deep Tendon Reflexes: Reflexes normal.       Fluids    Intake/Output Summary (Last 24 hours) at 1/23/2025 1931  Last data filed at 1/23/2025 1600  Gross per 24 hour   Intake 2211.34 ml   Output 1800 ml   Net 411.34 ml       Laboratory  Recent Results (from the past 48 hours)   POCT glucose device results    Collection Time: 01/21/25  8:19 PM   Result Value Ref Range    POC Glucose, Blood 141 (H) 65 - 99 mg/dL   CBC WITHOUT DIFFERENTIAL    Collection Time: 01/21/25  9:41 PM   Result Value Ref Range    WBC 14.3 (H) 4.8 - 10.8 K/uL    RBC 5.28 4.70 - 6.10 M/uL    Hemoglobin 16.7 14.0 - 18.0 g/dL    Hematocrit 48.4 42.0 - 52.0 %    MCV 91.7 81.4 - 97.8 fL    MCH 31.6 27.0 - 33.0 pg    MCHC 34.5 32.3 - 36.5 g/dL    RDW 47.8 35.9 - 50.0 fL    Platelet Count 257 164 - 446 K/uL    MPV 11.4 9.0 - 12.9 fL   HEMOGLOBIN A1C    Collection Time: 01/21/25  9:41 PM   Result Value Ref Range    Glycohemoglobin 7.6 (H) 4.0 - 5.6 %    Est Avg Glucose 171 mg/dL   URINE DRUG SCREEN    Collection Time: 01/21/25 10:23 PM   Result Value Ref Range    Amphetamines Urine Negative Negative    Barbiturates Negative Negative    Benzodiazepines Positive (A) Negative    Cocaine Metabolite Negative Negative    Fentanyl, Urine Negative Negative    Methadone Negative Negative    Opiates Negative Negative    Oxycodone Negative Negative    Phencyclidine -Pcp Negative Negative    Propoxyphene Negative Negative    Cannabinoid Metab Positive (A) Negative   URINALYSIS    Collection Time: 01/21/25 10:23 PM    Specimen: Urine   Result Value Ref Range    Color Yellow     Character Clear     Specific Gravity >=1.030 <1.035    Ph 6.0 5.0 - 8.0    Glucose Negative Negative mg/dL    Ketones >=80 (A) Negative mg/dL    Protein Negative Negative mg/dL    Bilirubin Small (A) Negative    Nitrite Negative Negative    Leukocyte Esterase Negative Negative    Occult Blood Trace (A) Negative    Micro Urine Req  Microscopic    URINE MICROSCOPIC (W/UA)    Collection Time: 01/21/25 10:23 PM   Result Value Ref Range    WBC 0-2 /hpf    RBC 0-2 /hpf    Bacteria None None /hpf    Epithelial Cells 0-2 0 - 5 /hpf    Mucous Threads Present /hpf    Urine Casts 0-2 0 - 2 /lpf    Hyaline Cast Present /lpf   POCT glucose device results    Collection Time: 01/22/25  4:28 AM   Result Value Ref Range    POC Glucose, Blood 255 (H) 65 - 99 mg/dL   CBC without Differential    Collection Time: 01/22/25  7:02 AM   Result Value Ref Range    WBC 15.8 (H) 4.8 - 10.8 K/uL    RBC 5.14 4.70 - 6.10 M/uL    Hemoglobin 16.2 14.0 - 18.0 g/dL    Hematocrit 47.5 42.0 - 52.0 %    MCV 92.4 81.4 - 97.8 fL    MCH 31.5 27.0 - 33.0 pg    MCHC 34.1 32.3 - 36.5 g/dL    RDW 48.9 35.9 - 50.0 fL    Platelet Count 244 164 - 446 K/uL    MPV 11.9 9.0 - 12.9 fL   Basic Metabolic Panel (BMP)    Collection Time: 01/22/25  7:02 AM   Result Value Ref Range    Sodium 139 135 - 145 mmol/L    Potassium 4.0 3.6 - 5.5 mmol/L    Chloride 98 96 - 112 mmol/L    Co2 20 20 - 33 mmol/L    Glucose 259 (H) 65 - 99 mg/dL    Bun 7 (L) 8 - 22 mg/dL    Creatinine 0.89 0.50 - 1.40 mg/dL    Calcium 9.0 8.4 - 10.2 mg/dL    Anion Gap 21.0 (H) 7.0 - 16.0   Magnesium    Collection Time: 01/22/25  7:02 AM   Result Value Ref Range    Magnesium 1.8 1.5 - 2.5 mg/dL   Phosphorus    Collection Time: 01/22/25  7:02 AM   Result Value Ref Range    Phosphorus 2.1 (L) 2.5 - 4.5 mg/dL   ESTIMATED GFR    Collection Time: 01/22/25  7:02 AM   Result Value Ref Range    GFR (CKD-EPI) 120 >60 mL/min/1.73 m 2   POCT glucose device results    Collection Time: 01/22/25 11:39 AM   Result Value Ref Range    POC Glucose, Blood 318 (H) 65 - 99 mg/dL   CBC WITHOUT DIFFERENTIAL    Collection Time: 01/22/25  4:11 PM   Result Value Ref Range    WBC 17.7 (H) 4.8 - 10.8 K/uL    RBC 5.43 4.70 - 6.10 M/uL    Hemoglobin 17.3 14.0 - 18.0 g/dL    Hematocrit 51.0 42.0 - 52.0 %    MCV 93.9 81.4 - 97.8 fL    MCH 31.9 27.0 - 33.0 pg     MCHC 33.9 32.3 - 36.5 g/dL    RDW 49.3 35.9 - 50.0 fL    Platelet Count 253 164 - 446 K/uL    MPV 11.2 9.0 - 12.9 fL   POCT glucose device results    Collection Time: 01/22/25  4:28 PM   Result Value Ref Range    POC Glucose, Blood 206 (H) 65 - 99 mg/dL   POCT glucose device results    Collection Time: 01/22/25 10:15 PM   Result Value Ref Range    POC Glucose, Blood 355 (H) 65 - 99 mg/dL   CBC WITHOUT DIFFERENTIAL    Collection Time: 01/23/25  1:17 AM   Result Value Ref Range    WBC 16.7 (H) 4.8 - 10.8 K/uL    RBC 4.84 4.70 - 6.10 M/uL    Hemoglobin 15.3 14.0 - 18.0 g/dL    Hematocrit 45.0 42.0 - 52.0 %    MCV 93.0 81.4 - 97.8 fL    MCH 31.6 27.0 - 33.0 pg    MCHC 34.0 32.3 - 36.5 g/dL    RDW 49.4 35.9 - 50.0 fL    Platelet Count 245 164 - 446 K/uL    MPV 11.9 9.0 - 12.9 fL   Basic Metabolic Panel    Collection Time: 01/23/25  1:17 AM   Result Value Ref Range    Sodium 138 135 - 145 mmol/L    Potassium 4.2 3.6 - 5.5 mmol/L    Chloride 99 96 - 112 mmol/L    Co2 16 (L) 20 - 33 mmol/L    Glucose 293 (H) 65 - 99 mg/dL    Bun 11 8 - 22 mg/dL    Creatinine 1.00 0.50 - 1.40 mg/dL    Calcium 9.0 8.4 - 10.2 mg/dL    Anion Gap 23.0 (H) 7.0 - 16.0   ESTIMATED GFR    Collection Time: 01/23/25  1:17 AM   Result Value Ref Range    GFR (CKD-EPI) 106 >60 mL/min/1.73 m 2   TROPONIN    Collection Time: 01/23/25  1:17 AM   Result Value Ref Range    Troponin T 25 (H) 6 - 19 ng/L   BETA-HYDROXYBUTYRIC ACID    Collection Time: 01/23/25  1:17 AM   Result Value Ref Range    beta-Hydroxybutyric Acid 6.50 (H) 0.02 - 0.27 mmol/L   TROPONIN    Collection Time: 01/23/25  5:10 AM   Result Value Ref Range    Troponin T 29 (H) 6 - 19 ng/L   CBC with Differential    Collection Time: 01/23/25  5:10 AM   Result Value Ref Range    WBC 18.4 (H) 4.8 - 10.8 K/uL    RBC 4.77 4.70 - 6.10 M/uL    Hemoglobin 15.1 14.0 - 18.0 g/dL    Hematocrit 45.5 42.0 - 52.0 %    MCV 95.4 81.4 - 97.8 fL    MCH 31.7 27.0 - 33.0 pg    MCHC 33.2 32.3 - 36.5 g/dL    RDW 49.9  35.9 - 50.0 fL    Platelet Count 181 164 - 446 K/uL    MPV 12.0 9.0 - 12.9 fL    Neutrophils-Polys 86.00 (H) 44.00 - 72.00 %    Lymphocytes 4.00 (L) 22.00 - 41.00 %    Monocytes 10.00 0.00 - 13.40 %    Eosinophils 0.00 0.00 - 6.90 %    Basophils 0.00 0.00 - 1.80 %    Nucleated RBC 0.00 0.00 - 0.20 /100 WBC    Neutrophils (Absolute) 15.82 (H) 1.82 - 7.42 K/uL    Lymphs (Absolute) 0.74 (L) 1.00 - 4.80 K/uL    Monos (Absolute) 1.84 (H) 0.00 - 0.85 K/uL    Eos (Absolute) 0.00 0.00 - 0.51 K/uL    Baso (Absolute) 0.00 0.00 - 0.12 K/uL    NRBC (Absolute) 0.00 K/uL   Comp Metabolic Panel    Collection Time: 01/23/25  5:10 AM   Result Value Ref Range    Sodium 139 135 - 145 mmol/L    Potassium 4.4 3.6 - 5.5 mmol/L    Chloride 98 96 - 112 mmol/L    Co2 13 (L) 20 - 33 mmol/L    Anion Gap 28.0 (H) 7.0 - 16.0    Glucose 294 (H) 65 - 99 mg/dL    Bun 12 8 - 22 mg/dL    Creatinine 0.97 0.50 - 1.40 mg/dL    Calcium 9.2 8.4 - 10.2 mg/dL    Correct Calcium 9.1 8.5 - 10.5 mg/dL    AST(SGOT) 18 12 - 45 U/L    ALT(SGPT) 13 2 - 50 U/L    Alkaline Phosphatase 79 30 - 99 U/L    Total Bilirubin 0.7 0.1 - 1.5 mg/dL    Albumin 4.1 3.2 - 4.9 g/dL    Total Protein 7.2 6.0 - 8.2 g/dL    Globulin 3.1 1.9 - 3.5 g/dL    A-G Ratio 1.3 g/dL   Phosphorus    Collection Time: 01/23/25  5:10 AM   Result Value Ref Range    Phosphorus 2.4 (L) 2.5 - 4.5 mg/dL   Magnesium    Collection Time: 01/23/25  5:10 AM   Result Value Ref Range    Magnesium 1.6 1.5 - 2.5 mg/dL   ESTIMATED GFR    Collection Time: 01/23/25  5:10 AM   Result Value Ref Range    GFR (CKD-EPI) 110 >60 mL/min/1.73 m 2   DIFFERENTIAL MANUAL    Collection Time: 01/23/25  5:10 AM   Result Value Ref Range    Manual Diff Status PERFORMED    PLATELET ESTIMATE    Collection Time: 01/23/25  5:10 AM   Result Value Ref Range    Plt Estimation Normal    MORPHOLOGY    Collection Time: 01/23/25  5:10 AM   Result Value Ref Range    RBC Morphology Normal     Smudge Cells Few    POCT venous blood gas device  results    Collection Time: 01/23/25  5:36 AM   Result Value Ref Range    Ph 7.301 (L) 7.310 - 7.450    Pco2 19.7 (L) 38.0 - 54.0 mmHg    Po2 70 (H) 23 - 48 mmHg    Tco2 10 (L) 20 - 33 mmol/L    SO2 93 (H) 60 - 85 %    Hco3 9.7 (L) 22.0 - 29.0 mmol/L    BE -14 (L) -2 - 3 mmol/L    Body Temp 97.2 F degrees    Ph Temp Correc 7.312 7.310 - 7.450    Pco2 Temp Teodoro 19.0 (L) 38.0 - 54.0 mmHg    Po2 Temp Corre 67 mmHg    Specimen Venous    POCT lactate device results    Collection Time: 01/23/25  5:36 AM   Result Value Ref Range    iStat Lactate 2.0 0.5 - 2.0 mmol/L   POCT glucose device results    Collection Time: 01/23/25  5:57 AM   Result Value Ref Range    POC Glucose, Blood 434 (HH) 65 - 99 mg/dL   POCT glucose device results    Collection Time: 01/23/25  8:16 AM   Result Value Ref Range    POC Glucose, Blood 496 (HH) 65 - 99 mg/dL   POCT glucose device results    Collection Time: 01/23/25  9:47 AM   Result Value Ref Range    POC Glucose, Blood 398 (H) 65 - 99 mg/dL   Basic Metabolic Panel    Collection Time: 01/23/25 10:08 AM   Result Value Ref Range    Sodium 133 (L) 135 - 145 mmol/L    Potassium 5.4 3.6 - 5.5 mmol/L    Chloride 96 96 - 112 mmol/L    Co2 10 (L) 20 - 33 mmol/L    Glucose 416 (H) 65 - 99 mg/dL    Bun 19 8 - 22 mg/dL    Creatinine 1.34 0.50 - 1.40 mg/dL    Calcium 8.0 (L) 8.4 - 10.2 mg/dL    Anion Gap 27.0 (H) 7.0 - 16.0   TROPONIN    Collection Time: 01/23/25 10:08 AM   Result Value Ref Range    Troponin T 36 (H) 6 - 19 ng/L   ESTIMATED GFR    Collection Time: 01/23/25 10:08 AM   Result Value Ref Range    GFR (CKD-EPI) 74 >60 mL/min/1.73 m 2   POCT glucose device results    Collection Time: 01/23/25 10:33 AM   Result Value Ref Range    POC Glucose, Blood 389 (H) 65 - 99 mg/dL   POCT glucose device results    Collection Time: 01/23/25 11:29 AM   Result Value Ref Range    POC Glucose, Blood 360 (H) 65 - 99 mg/dL   POCT glucose device results    Collection Time: 01/23/25 12:30 PM   Result Value Ref Range     POC Glucose, Blood 299 (H) 65 - 99 mg/dL   POCT glucose device results    Collection Time: 25  1:33 PM   Result Value Ref Range    POC Glucose, Blood 246 (H) 65 - 99 mg/dL   Basic Metabolic Panel (BMP)    Collection Time: 25  2:00 PM   Result Value Ref Range    Sodium 138 135 - 145 mmol/L    Potassium 4.1 3.6 - 5.5 mmol/L    Chloride 104 96 - 112 mmol/L    Co2 18 (L) 20 - 33 mmol/L    Glucose 231 (H) 65 - 99 mg/dL    Bun 18 8 - 22 mg/dL    Creatinine 1.23 0.50 - 1.40 mg/dL    Calcium 8.6 8.4 - 10.2 mg/dL    Anion Gap 16.0 7.0 - 16.0   Phosphorus    Collection Time: 25  2:00 PM   Result Value Ref Range    Phosphorus 2.2 (L) 2.5 - 4.5 mg/dL   Magnesium    Collection Time: 25  2:00 PM   Result Value Ref Range    Magnesium 1.6 1.5 - 2.5 mg/dL   TROPONIN    Collection Time: 25  2:00 PM   Result Value Ref Range    Troponin T 52 (H) 6 - 19 ng/L   ESTIMATED GFR    Collection Time: 25  2:00 PM   Result Value Ref Range    GFR (CKD-EPI) 82 >60 mL/min/1.73 m 2   EKG    Collection Time: 25  2:30 PM   Result Value Ref Range    Report       [facility]    Test Date:  2025  Pt Name:    JASPAL DUBON             Department: ICU  MRN:        [id number]                      Room:       [facility]  Gender:     Male                         Technician: [id number]  :        2000                   Requested By:KERRI DUMONT  Order #:    [id number]                    Reading MD: Terry Cleary MD    Measurements  Intervals                                Axis  Rate:       134                          P:          72  VA:         114                          QRS:        94  QRSD:       122                          T:          -25  QT:         330  QTc:        493    Interpretive Statements  Sinus tachycardia  Nonspecific intraventricular conduction delay  Borderline repolarization abnormality  Borderline ST elevation, anterior leads  Compared to ECG 2025  14:14:51  Intraventricular conduction delay now present  ST (T wave) deviation now present  Atrial abnormality no longer present  Electronically Signed  On 01- 14:30:59 PST by Terry Cleary MD     POCT glucose device results    Collection Time: 01/23/25  2:36 PM   Result Value Ref Range    POC Glucose, Blood 195 (H) 65 - 99 mg/dL   POCT glucose device results    Collection Time: 01/23/25  3:30 PM   Result Value Ref Range    POC Glucose, Blood 148 (H) 65 - 99 mg/dL   POCT glucose device results    Collection Time: 01/23/25  4:31 PM   Result Value Ref Range    POC Glucose, Blood 110 (H) 65 - 99 mg/dL   Basic Metabolic Panel    Collection Time: 01/23/25  6:04 PM   Result Value Ref Range    Sodium 140 135 - 145 mmol/L    Potassium 3.8 3.6 - 5.5 mmol/L    Chloride 107 96 - 112 mmol/L    Co2 23 20 - 33 mmol/L    Glucose 101 (H) 65 - 99 mg/dL    Bun 16 8 - 22 mg/dL    Creatinine 1.06 0.50 - 1.40 mg/dL    Calcium 8.7 8.4 - 10.2 mg/dL    Anion Gap 10.0 7.0 - 16.0   ESTIMATED GFR    Collection Time: 01/23/25  6:04 PM   Result Value Ref Range    GFR (CKD-EPI) 99 >60 mL/min/1.73 m 2   POCT glucose device results    Collection Time: 01/23/25  6:33 PM   Result Value Ref Range    POC Glucose, Blood 97 65 - 99 mg/dL       Imaging  DX-CHEST-PORTABLE (1 VIEW)   Final Result      Cardiomegaly.        Assessment/Plan    * Alcohol withdrawal syndrome with complication (HCC)- (present on admission)  Assessment & Plan  alcohol withdrawal with delirium no seizures  Transfer to ICU  Precedex gtt, ativan ladder per CIWA/EtOH withdrawal protocol  Soft wrist restraints due to danger to self, pulling at tubes/lines  Resume  PO librium  S/p rally bag, cont multivitamins  ICU electrolyte repletion protocol  Seizure precautions  Protecting airway adequately      DKA, type 1, not at goal (HCC)- (present on admission)  Assessment & Plan  Incited by alcohol  Q 1 hour accuchecks, serial chemistry and aggressive replace K, PO4,  Mg  insulin drip/DKA protocol  Anion gap elevated due to ketosis  Maintain insulin gtt until AG closes and HCO3 >18 or until beta-hydroxybutyrate negative  F/u serum osm, ketones, hgA1c, vbg/abg  Fluid resus NS/LR and transition to dextrose-containing IVF per protocol as glucose normalizes  Diabetic education  HgA1C 7.6%  Closely monitor on telemetry  NPO    GI bleed  Assessment & Plan  Upper GI bleed attributed to grade 4 esophagitis likely related to alcoholism  Continue PPI twice daily  DC octreotide  Trend CBC  Resume DVT prophylaxis      Marijuana user- (present on admission)  Assessment & Plan  Heavy marijuana use, risk for CVS/withdrawal  montior    Demand ischemia of myocardium (HCC)  Assessment & Plan  Due to DKA, alcohol withdrawal  ECG with ST, repolarization, no ischemic changes  Trend cardiac enzymes, trend telemetry      Discussed patient condition and risk of morbidity and/or mortality with Hospitalist, RN, RT, Pharmacy, Charge nurse / hot rounds, and Patient.      The patient remains critically ill.  Critical care time = 50 minutes in directly providing and coordinating critical care and extensive data review.  No time overlap and excludes procedures.    This note was generated using voice recognition software which has a chance of producing errors of grammar and content.  I have made every reasonable attempt to find and correct any errors, but it should be expected that some may not be found prior to finalization of this note.  __________  Aaron Phillips MD  Pulmonary and Critical Care Medicine  [facility]

## 2025-01-24 NOTE — ASSESSMENT & PLAN NOTE
Due to DKA, alcohol withdrawal  ECG with ST, repolarization, no ischemic changes  Trend cardiac enzymes, trend telemetry

## 2025-01-24 NOTE — ASSESSMENT & PLAN NOTE
Upper GI bleed attributed to grade 4 esophagitis likely related to alcoholism  Will need home PPI.   Encouraged to abstain from ETOH.

## 2025-01-24 NOTE — ASSESSMENT & PLAN NOTE
Incited by alcohol  1/24: gap closed but rapidly reopened, AG uptrending, BOH > 8, compensated metabolic acidosis on ABG  Persistent ketosis from euglycemic DKA/alcoholic ketosis  Lactic acid WNL  Resume insulin drip, DKA protocol  Maintain insulin gtt until AG closes and HCO3 >18 AND beta-hydroxybutyrate negative  Start thiamine IV  Start diabetic diet when DKA closes closely monitoring for refeeding syndrome  HgA1C 7.6%  AG closed overnight. On Lantus. Will resume his insulin pump this evening. Eating.

## 2025-01-24 NOTE — PROGRESS NOTES
Late entry:    This RN was contacted by charge FLORENCIO Chu to assess pt for increasing CIWA scores. Pt seen and assessed for CIWA scoring by this RN and am in agreement with primary RN Medardo's score of 31. Pt is restless and somewhat agitated, though is easily redirectable. Pt has received 7.5mg of Ativan for the shift. Dr. Reyes notified of above via Voalte at 0417 hours. Per Dr. Reyes, transfer pt to ICU for initiation of Precedex gtt and increasing CIWA scores. Pt to ICU room 324 via bed on telemetry monitoring accompanied by this RN and FLORENCIO Chu.

## 2025-01-24 NOTE — CARE PLAN
The patient is Stable - Low risk of patient condition declining or worsening    Shift Goals  Clinical Goals: ETOH, monitor labs  Patient Goals: Go Home  Family Goals: NICOLE    Progress made toward(s) clinical / shift goals:        Problem: Knowledge Deficit - Standard  Goal: Patient and family/care givers will demonstrate understanding of plan of care, disease process/condition, diagnostic tests and medications  Outcome: Progressing   Discussed plan of care with patient including ETOH withdrawal, medications, labs, and diet.   Problem: Fall Risk  Goal: Patient will remain free from falls  Outcome: Progressing   Educated on use of call light prior to getting up, bed locked and low.     Patient is not progressing towards the following goals:

## 2025-01-24 NOTE — PROGRESS NOTES
Telemetry Shift Summary     Rhythm: ST  HR: 115-126  Ectopy: r PVC, r PAC    Measurements: 0.12/0.08/0.30    Normal Values  Rhythm: SR  HR:   Measurements: 0.12-0.20/0.08-0.10/0.30-0.52

## 2025-01-25 ENCOUNTER — PHARMACY VISIT (OUTPATIENT)
Dept: PHARMACY | Facility: MEDICAL CENTER | Age: 25
End: 2025-01-25
Payer: COMMERCIAL

## 2025-01-25 VITALS
WEIGHT: 159.61 LBS | DIASTOLIC BLOOD PRESSURE: 96 MMHG | RESPIRATION RATE: 32 BRPM | TEMPERATURE: 98.4 F | HEIGHT: 64 IN | HEART RATE: 126 BPM | BODY MASS INDEX: 27.25 KG/M2 | OXYGEN SATURATION: 89 % | SYSTOLIC BLOOD PRESSURE: 144 MMHG

## 2025-01-25 PROBLEM — F10.939 ALCOHOL WITHDRAWAL SYNDROME WITH COMPLICATION (HCC): Status: RESOLVED | Noted: 2025-01-21 | Resolved: 2025-01-25

## 2025-01-25 PROBLEM — I24.89 DEMAND ISCHEMIA OF MYOCARDIUM (HCC): Status: RESOLVED | Noted: 2025-01-23 | Resolved: 2025-01-25

## 2025-01-25 PROBLEM — R65.10 SIRS (SYSTEMIC INFLAMMATORY RESPONSE SYNDROME) (HCC): Status: RESOLVED | Noted: 2025-01-21 | Resolved: 2025-01-25

## 2025-01-25 LAB
ALBUMIN SERPL BCP-MCNC: 3.2 G/DL (ref 3.2–4.9)
ALBUMIN/GLOB SERPL: 1.3 G/DL
ALP SERPL-CCNC: 66 U/L (ref 30–99)
ALT SERPL-CCNC: 11 U/L (ref 2–50)
ANION GAP SERPL CALC-SCNC: 10 MMOL/L (ref 7–16)
ANION GAP SERPL CALC-SCNC: 9 MMOL/L (ref 7–16)
AST SERPL-CCNC: 16 U/L (ref 12–45)
B-OH-BUTYR SERPL-MCNC: 0.3 MMOL/L (ref 0.02–0.27)
BASOPHILS # BLD AUTO: 0.2 % (ref 0–1.8)
BASOPHILS # BLD: 0.02 K/UL (ref 0–0.12)
BILIRUB SERPL-MCNC: 0.7 MG/DL (ref 0.1–1.5)
BUN SERPL-MCNC: 3 MG/DL (ref 8–22)
BUN SERPL-MCNC: 3 MG/DL (ref 8–22)
CALCIUM ALBUM COR SERPL-MCNC: 9.2 MG/DL (ref 8.5–10.5)
CALCIUM SERPL-MCNC: 8.6 MG/DL (ref 8.4–10.2)
CALCIUM SERPL-MCNC: 9.2 MG/DL (ref 8.4–10.2)
CHLORIDE SERPL-SCNC: 100 MMOL/L (ref 96–112)
CHLORIDE SERPL-SCNC: 102 MMOL/L (ref 96–112)
CO2 SERPL-SCNC: 27 MMOL/L (ref 20–33)
CO2 SERPL-SCNC: 27 MMOL/L (ref 20–33)
CREAT SERPL-MCNC: 0.71 MG/DL (ref 0.5–1.4)
CREAT SERPL-MCNC: 0.76 MG/DL (ref 0.5–1.4)
EOSINOPHIL # BLD AUTO: 0.02 K/UL (ref 0–0.51)
EOSINOPHIL NFR BLD: 0.2 % (ref 0–6.9)
ERYTHROCYTE [DISTWIDTH] IN BLOOD BY AUTOMATED COUNT: 49.5 FL (ref 35.9–50)
GFR SERPLBLD CREATININE-BSD FMLA CKD-EPI: 126 ML/MIN/1.73 M 2
GFR SERPLBLD CREATININE-BSD FMLA CKD-EPI: 129 ML/MIN/1.73 M 2
GLOBULIN SER CALC-MCNC: 2.4 G/DL (ref 1.9–3.5)
GLUCOSE BLD STRIP.AUTO-MCNC: 105 MG/DL (ref 65–99)
GLUCOSE BLD STRIP.AUTO-MCNC: 145 MG/DL (ref 65–99)
GLUCOSE BLD STRIP.AUTO-MCNC: 147 MG/DL (ref 65–99)
GLUCOSE BLD STRIP.AUTO-MCNC: 147 MG/DL (ref 65–99)
GLUCOSE BLD STRIP.AUTO-MCNC: 162 MG/DL (ref 65–99)
GLUCOSE BLD STRIP.AUTO-MCNC: 175 MG/DL (ref 65–99)
GLUCOSE SERPL-MCNC: 103 MG/DL (ref 65–99)
GLUCOSE SERPL-MCNC: 163 MG/DL (ref 65–99)
HCT VFR BLD AUTO: 42.5 % (ref 42–52)
HGB BLD-MCNC: 14.5 G/DL (ref 14–18)
IMM GRANULOCYTES # BLD AUTO: 0.05 K/UL (ref 0–0.11)
IMM GRANULOCYTES NFR BLD AUTO: 0.5 % (ref 0–0.9)
LYMPHOCYTES # BLD AUTO: 2.14 K/UL (ref 1–4.8)
LYMPHOCYTES NFR BLD: 21.7 % (ref 22–41)
MAGNESIUM SERPL-MCNC: 1.5 MG/DL (ref 1.5–2.5)
MCH RBC QN AUTO: 31.6 PG (ref 27–33)
MCHC RBC AUTO-ENTMCNC: 34.1 G/DL (ref 32.3–36.5)
MCV RBC AUTO: 92.6 FL (ref 81.4–97.8)
MONOCYTES # BLD AUTO: 1.15 K/UL (ref 0–0.85)
MONOCYTES NFR BLD AUTO: 11.7 % (ref 0–13.4)
NEUTROPHILS # BLD AUTO: 6.47 K/UL (ref 1.82–7.42)
NEUTROPHILS NFR BLD: 65.7 % (ref 44–72)
NRBC # BLD AUTO: 0 K/UL
NRBC BLD-RTO: 0 /100 WBC (ref 0–0.2)
PHOSPHATE SERPL-MCNC: 2.3 MG/DL (ref 2.5–4.5)
PHOSPHATE SERPL-MCNC: 2.4 MG/DL (ref 2.5–4.5)
PLATELET # BLD AUTO: 176 K/UL (ref 164–446)
PMV BLD AUTO: 10.7 FL (ref 9–12.9)
POTASSIUM SERPL-SCNC: 3.5 MMOL/L (ref 3.6–5.5)
POTASSIUM SERPL-SCNC: 4.1 MMOL/L (ref 3.6–5.5)
PROT SERPL-MCNC: 5.6 G/DL (ref 6–8.2)
RBC # BLD AUTO: 4.59 M/UL (ref 4.7–6.1)
SODIUM SERPL-SCNC: 137 MMOL/L (ref 135–145)
SODIUM SERPL-SCNC: 138 MMOL/L (ref 135–145)
TSH SERPL DL<=0.005 MIU/L-ACNC: 1.75 UIU/ML (ref 0.38–5.33)
WBC # BLD AUTO: 9.9 K/UL (ref 4.8–10.8)

## 2025-01-25 PROCEDURE — RXMED WILLOW AMBULATORY MEDICATION CHARGE: Performed by: INTERNAL MEDICINE

## 2025-01-25 PROCEDURE — 700111 HCHG RX REV CODE 636 W/ 250 OVERRIDE (IP): Performed by: INTERNAL MEDICINE

## 2025-01-25 PROCEDURE — 84100 ASSAY OF PHOSPHORUS: CPT

## 2025-01-25 PROCEDURE — 85025 COMPLETE CBC W/AUTO DIFF WBC: CPT

## 2025-01-25 PROCEDURE — 700102 HCHG RX REV CODE 250 W/ 637 OVERRIDE(OP): Performed by: INTERNAL MEDICINE

## 2025-01-25 PROCEDURE — 84443 ASSAY THYROID STIM HORMONE: CPT

## 2025-01-25 PROCEDURE — 83735 ASSAY OF MAGNESIUM: CPT

## 2025-01-25 PROCEDURE — 700111 HCHG RX REV CODE 636 W/ 250 OVERRIDE (IP): Performed by: HOSPITALIST

## 2025-01-25 PROCEDURE — 82010 KETONE BODYS QUAN: CPT

## 2025-01-25 PROCEDURE — 80053 COMPREHEN METABOLIC PANEL: CPT

## 2025-01-25 PROCEDURE — 82962 GLUCOSE BLOOD TEST: CPT | Mod: 91

## 2025-01-25 PROCEDURE — 80048 BASIC METABOLIC PNL TOTAL CA: CPT

## 2025-01-25 PROCEDURE — A9270 NON-COVERED ITEM OR SERVICE: HCPCS | Performed by: INTERNAL MEDICINE

## 2025-01-25 PROCEDURE — 99238 HOSP IP/OBS DSCHRG MGMT 30/<: CPT | Performed by: INTERNAL MEDICINE

## 2025-01-25 PROCEDURE — 94760 N-INVAS EAR/PLS OXIMETRY 1: CPT

## 2025-01-25 RX ORDER — DEXTROSE MONOHYDRATE 25 G/50ML
25 INJECTION, SOLUTION INTRAVENOUS
Status: DISCONTINUED | OUTPATIENT
Start: 2025-01-25 | End: 2025-01-25 | Stop reason: HOSPADM

## 2025-01-25 RX ORDER — INSULIN LISPRO 100 [IU]/ML
2-9 INJECTION, SOLUTION INTRAVENOUS; SUBCUTANEOUS
Status: DISCONTINUED | OUTPATIENT
Start: 2025-01-25 | End: 2025-01-25 | Stop reason: HOSPADM

## 2025-01-25 RX ORDER — OMEPRAZOLE 40 MG/1
40 CAPSULE, DELAYED RELEASE ORAL DAILY
Qty: 90 CAPSULE | Refills: 0 | Status: SHIPPED | OUTPATIENT
Start: 2025-01-25

## 2025-01-25 RX ORDER — OMEPRAZOLE 20 MG/1
20 CAPSULE, DELAYED RELEASE ORAL 2 TIMES DAILY
Status: DISCONTINUED | OUTPATIENT
Start: 2025-01-25 | End: 2025-01-25 | Stop reason: HOSPADM

## 2025-01-25 RX ADMIN — GABAPENTIN 300 MG: 300 CAPSULE ORAL at 05:45

## 2025-01-25 RX ADMIN — FOLIC ACID 1 MG: 1 TABLET ORAL at 05:45

## 2025-01-25 RX ADMIN — THIAMINE HYDROCHLORIDE 200 MG: 100 INJECTION, SOLUTION INTRAMUSCULAR; INTRAVENOUS at 05:44

## 2025-01-25 RX ADMIN — PANTOPRAZOLE SODIUM 40 MG: 40 INJECTION, POWDER, FOR SOLUTION INTRAVENOUS at 05:44

## 2025-01-25 RX ADMIN — CHLORDIAZEPOXIDE HYDROCHLORIDE 50 MG: 25 CAPSULE ORAL at 03:36

## 2025-01-25 RX ADMIN — CHLORDIAZEPOXIDE HYDROCHLORIDE 50 MG: 25 CAPSULE ORAL at 09:41

## 2025-01-25 RX ADMIN — POTASSIUM CHLORIDE 10 MEQ: 7.46 INJECTION, SOLUTION INTRAVENOUS at 01:04

## 2025-01-25 RX ADMIN — GABAPENTIN 300 MG: 300 CAPSULE ORAL at 11:15

## 2025-01-25 RX ADMIN — THERA TABS 1 TABLET: TAB at 05:45

## 2025-01-25 ASSESSMENT — LIFESTYLE VARIABLES
HEADACHE, FULLNESS IN HEAD: NOT PRESENT
ANXIETY: MILDLY ANXIOUS
VISUAL DISTURBANCES: NOT PRESENT
ANXIETY: MILDLY ANXIOUS
AGITATION: NORMAL ACTIVITY
ORIENTATION AND CLOUDING OF SENSORIUM: ORIENTED AND CAN DO SERIAL ADDITIONS
TREMOR: NO TREMOR
HEADACHE, FULLNESS IN HEAD: NOT PRESENT
PAROXYSMAL SWEATS: NO SWEAT VISIBLE
PAROXYSMAL SWEATS: NO SWEAT VISIBLE
TREMOR: NO TREMOR
ANXIETY: *
ANXIETY: MILDLY ANXIOUS
TREMOR: NO TREMOR
NAUSEA AND VOMITING: NO NAUSEA AND NO VOMITING
NAUSEA AND VOMITING: *
NAUSEA AND VOMITING: NO NAUSEA AND NO VOMITING
PAROXYSMAL SWEATS: NO SWEAT VISIBLE
PAROXYSMAL SWEATS: NO SWEAT VISIBLE
NAUSEA AND VOMITING: NO NAUSEA AND NO VOMITING
ORIENTATION AND CLOUDING OF SENSORIUM: ORIENTED AND CAN DO SERIAL ADDITIONS
TREMOR: NO TREMOR
AGITATION: NORMAL ACTIVITY
TOTAL SCORE: 1
TOTAL SCORE: 2
VISUAL DISTURBANCES: NOT PRESENT
AUDITORY DISTURBANCES: NOT PRESENT
HEADACHE, FULLNESS IN HEAD: NOT PRESENT
TOTAL SCORE: 1
ORIENTATION AND CLOUDING OF SENSORIUM: ORIENTED AND CAN DO SERIAL ADDITIONS
AUDITORY DISTURBANCES: NOT PRESENT
TOTAL SCORE: 3
AUDITORY DISTURBANCES: NOT PRESENT
VISUAL DISTURBANCES: NOT PRESENT
AGITATION: NORMAL ACTIVITY
VISUAL DISTURBANCES: NOT PRESENT
ORIENTATION AND CLOUDING OF SENSORIUM: ORIENTED AND CAN DO SERIAL ADDITIONS
AGITATION: NORMAL ACTIVITY
HEADACHE, FULLNESS IN HEAD: NOT PRESENT
AUDITORY DISTURBANCES: NOT PRESENT

## 2025-01-25 ASSESSMENT — PAIN DESCRIPTION - PAIN TYPE
TYPE: ACUTE PAIN

## 2025-01-25 NOTE — DISCHARGE SUMMARY
"Discharge Summary    CHIEF COMPLAINT ON ADMISSION  Chief Complaint   Patient presents with    Bloody Stools     Pt sent here from [place] for endoscopy r/t intractable nausea vomiting and upper GIB. Pt recently stopped drinking, etoh withdrawal and N/V. Pt started on octreotide gtt at outside facility.        Reason for Admission  EMS     Admission Date  1/21/2025    CODE STATUS  Full Code    HPI & HOSPITAL COURSE  From chart: \"25 y.o. male who presented 1/21/2025 with alcohol withdrawal, metabolic acidosis.  On the floor he was scoring high on CIWA despite multiple doses of benzodiazepines also developing worsening anion gap acidosis concerning for DKA and was transferred to ICU for Precedex drip and insulin drip..  At time of admission he was reporting nausea and vomiting with bloody emesis.  He was started on an octreotide drip.  He drinks about a pint of Anand Parham per day also uses marijuana extensively     Underwent EGD demonstrating grade D esophagitis and portal hypertensive gastropathy.  Octreotide DC'd.  Continued PPI.  Started on clear liquid diet.     Interval Problem Update  Chart review from the past 24 hours includes imaging, laboratory studies, vital signs and notes available.  Pertinent data for today's visit includes     No acute events overnight  Anion gap/acidosis reopened- sugars controlled\"     24h events: AG closed overnight. Lantus given. The patient and his mother indicated that he is able to care for himself and that he would like to discharge home today. He stated he can and will call his endocrinologist Dr. Patino to get a follow up appointment. He indicated he has insulin supplies and his home medications available. 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     Therefore, he is discharged in good and stable condition to home with close outpatient follow-up.    The patient met 2-midnight criteria for " an inpatient stay at the time of discharge.    Discharge Date  1/25/25    FOLLOW UP ITEMS POST DISCHARGE  Schedule follow up with endocrinology   Start taking proton pump inhibitor     DISCHARGE DIAGNOSES  Principal Problem:    DKA, type 1, not at goal (HCC) (POA: Yes)  Active Problems:    Marijuana user (POA: Yes)    Alcohol withdrawal syndrome with complication (HCC) (POA: Yes)    Metabolic acidosis (POA: Yes)    SIRS (systemic inflammatory response syndrome) (HCC) (POA: Yes)    Hypomagnesemia (POA: Yes)    Hypophosphatemia (POA: Yes)    GI bleed (POA: Unknown)    Demand ischemia of myocardium (HCC) (POA: Unknown)  Resolved Problems:    * No resolved hospital problems. *      FOLLOW UP  No future appointments.  No follow-up provider specified.    MEDICATIONS ON DISCHARGE     Medication List        START taking these medications        Instructions   omeprazole 40 MG delayed-release capsule  Commonly known as: PriLOSEC   Take 1 Capsule by mouth every day.  Dose: 40 mg            CONTINUE taking these medications        Instructions   insulin infusion pump Jessica   Inject  under the skin. Patient's own SQ insulin pump. Disconnect pump if patient becomes hypoglycemic and altered. Contact the provider if there is a pump malfunction/failure, if there are concerns with the patient/guardian's ability to self-manage their pump, the patient becomes altered, or if blood glucose >300 after two scheduled, consecutive fingersticks.  Patient may give additional boluses for snacks or corrections; nurse to chart these when administered by the patient.    Type of Pump:  Closed-loop system:   Date of last tubing change: 1/18/2025 - Change tubing and site every 72 hours    Type of Insulin: Humalog  Dosing:  Basal rate:    -  =  units/hr    -  =  units/hr    -  =  units/hr  Bolus ratio:    unit :  g carbohydrate at  (breakfast, lunch, dinner, snacks; or during certain hours)    unit :  g carbohydrate at  (breakfast, lunch, dinner,  snacks; or during certain hours)  Correction ratio:    units for every  over  mg/dL at     units for every  over  mg/dL at            ASK your doctor about these medications        Instructions   insulin glargine 100 UNIT/ML Soln  Commonly known as: Lantus   Inject 35 Units under the skin every evening.  Dose: 35 Units     insulin lispro 100 UNIT/ML  Commonly known as: HumaLOG   Inject 3-14 Units under the skin 3 times a day before meals. Per sliding scale  Dose: 3-14 Units     TYLENOL PO   Take 2 Tablets by mouth 2 times a day as needed (For pain). Pt is not sure the strength (OTC)  Dose: 2 Tablet              Allergies  No Known Allergies    DIET  Orders Placed This Encounter   Procedures    Diet Order Diet: Consistent CHO (Diabetic)     Standing Status:   Standing     Number of Occurrences:   1     Order Specific Question:   Diet:     Answer:   Consistent CHO (Diabetic) [4]       ACTIVITY  As tolerated.  Weight bearing as tolerated    CONSULTATIONS  Gastroenterology (Dr. Marc Glover).     PROCEDURES  Esophagogastroduodenoscopy     LABORATORY  Lab Results   Component Value Date    SODIUM 137 01/25/2025    POTASSIUM 3.5 (L) 01/25/2025    CHLORIDE 100 01/25/2025    CO2 27 01/25/2025    GLUCOSE 103 (H) 01/25/2025    BUN 3 (L) 01/25/2025    CREATININE 0.71 01/25/2025        Lab Results   Component Value Date    WBC 9.9 01/25/2025    HEMOGLOBIN 14.5 01/25/2025    HEMATOCRIT 42.5 01/25/2025    PLATELETCT 176 01/25/2025        Total time of the discharge process exceeds 20 minutes.

## 2025-01-25 NOTE — PROGRESS NOTES
OVERNIGHT HOSPITALIST:    Paged by nursing Staff.  Patient anion gap and CO2 are stable and I started transitioning him out of the DKA protocol giving 20 mg of glargine around 2 AM this morning.  Patient should continue to have every 4 hours BMPs as gap has reopened prior.

## 2025-01-25 NOTE — CARE PLAN
The patient is Stable - Low risk of patient condition declining or worsening    Shift Goals  Clinical Goals: Monitor labs and sugar checks, ensure environmental safety  Patient Goals: Get better to go home  Family Goals: No family present    Progress made toward(s) clinical / shift goals:    Problem: Optimal Care for Alcohol Withdrawal  Goal: Optimal Care for the alcohol withdrawal patient  Outcome: Progressing     Problem: Pain - Standard  Goal: Alleviation of pain or a reduction in pain to the patient’s comfort goal  Outcome: Progressing     Problem: Fall Risk  Goal: Patient will remain free from falls  Outcome: Progressing       Patient is not progressing towards the following goals:

## 2025-01-25 NOTE — PROGRESS NOTES
12-hour chart check complete.    Monitor Summary  Rhythm: SR-ST  Rate:   Ectopy: rPAC  Measurements: 0.12/0.08/0.34

## 2025-01-25 NOTE — DIETARY
"Nutrition Services: Diabetes Education Consult   Day 4 of admit.  Karlie Nathan is a 25 y.o. male with admitting DX of Alcohol withdrawal syndrome with complication (HCC) [F10.464]    RD able to visit pt at bedside to provide nutrition education. Pt with \"Persistent ketosis from euglycemic DKA/alcoholic ketosis\" per MD note 1/24/25. Pt reports he uses Omnipod insulin pump which communicates with his Dexcom continuous glucose monitor (CGM). Last A1C 7.6% on 1/21/25. Pt states he has ketone test strips at home and he has hx of DKA in the past.    RD briefly discussed carbohydrate counting, nutrition label reading, and balanced plate method. Pt is not interested in any comprehensive nutrition education at this time. RD provided handout reinforcing topics discussed. Pt demonstrated evidence of learning. RD able to answer all questions to patient's satisfaction.     No other education needs identified at this time. Consider referral to outpatient nutrition services for continuation of education as indicated or per pt preferences. Pt may benefit from psych / social work consult due to hx of substance abuse.     Please re-consult RD as indicated.    "

## 2025-01-25 NOTE — DISCHARGE INSTRUCTIONS
Discharge Instructions    Discharged to home by car with relative. Discharged via walking, hospital escort: Yes.  Special equipment needed: Not Applicable    Be sure to schedule a follow-up appointment with your primary care doctor or any specialists as instructed.     Discharge Plan:        I understand that a diet low in cholesterol, fat, and sodium is recommended for good health. Unless I have been given specific instructions below for another diet, I accept this instruction as my diet prescription.   Other diet: diabetic    Special Instructions: None    -Is this patient being discharged with medication to prevent blood clots?  No    Is patient discharged on Warfarin / Coumadin?   No

## 2025-01-25 NOTE — PROGRESS NOTES
"Pulmonary Progress Note    Date of admission  1/21/2025    Chief Complaint  25 y.o. male admitted 1/21/2025 with GI bleed, alcohol withdrawal, subsequent DKA    Hospital Course  From chart: \"25 y.o. male who presented 1/21/2025 with alcohol withdrawal, metabolic acidosis.  On the floor he was scoring high on CIWA despite multiple doses of benzodiazepines also developing worsening anion gap acidosis concerning for DKA and was transferred to ICU for Precedex drip and insulin drip..  At time of admission he was reporting nausea and vomiting with bloody emesis.  He was started on an octreotide drip.  He drinks about a pint of Anand Parham per day also uses marijuana extensively     Underwent EGD demonstrating grade D esophagitis and portal hypertensive gastropathy.  Octreotide DC'd.  Continued PPI.  Started on clear liquid diet.    Interval Problem Update  Chart review from the past 24 hours includes imaging, laboratory studies, vital signs and notes available.  Pertinent data for today's visit includes    No acute events overnight  Anion gap/acidosis reopened- sugars controlled\"    24h events: AG closed overnight. Lantus given. On 2 lpm with saturations 93-97%. Tachycardic.   Tubes, Lines, Drains: PIVs  Drips: None  Nutrition: Diabetic diet   Notable lab trends: WBC normalized. K 3.5. AG closed.   CXR: reviewed   Tmax: afebrile  ProCal: n/a  Antibiotics: n/a  Steroids: n/a  Cultures: negative to date   I/O: reviewed   PPX: Lovenox   BM regimen: MiraLAX, senna-docusate, milk magnesia, bisacodyl  Last BM: prior       Review of Systems  Review of Systems   Constitutional:  Positive for malaise/fatigue.        \"I want to go home\"   All other systems reviewed and are negative.       Vital Signs for last 24 hours   Temp:  [36.8 °C (98.2 °F)-37.2 °C (99 °F)] 36.9 °C (98.4 °F)  Pulse:  [] 114  Resp:  [13-42] 23  BP: ()/(56-91) 153/81  SpO2:  [87 %-98 %] 93 %    Physical Exam   Physical Exam  Vitals reviewed. Exam " conducted with a chaperone present.   Constitutional:       General: He is not in acute distress.     Appearance: He is well-developed. He is not toxic-appearing or diaphoretic.   HENT:      Nose: Nose normal.      Mouth/Throat:      Mouth: Mucous membranes are moist.      Pharynx: No oropharyngeal exudate.   Eyes:      General: No scleral icterus.        Right eye: No discharge.         Left eye: No discharge.      Conjunctiva/sclera: Conjunctivae normal.      Pupils: Pupils are equal, round, and reactive to light.   Neck:      Thyroid: No thyromegaly.      Vascular: No JVD.      Trachea: No tracheal deviation.   Cardiovascular:      Rate and Rhythm: Regular rhythm. Tachycardia present.      Heart sounds: Normal heart sounds. No murmur heard.  Pulmonary:      Effort: Pulmonary effort is normal.      Breath sounds: Normal breath sounds. No stridor. No wheezing or rales.   Abdominal:      General: There is no distension.      Palpations: Abdomen is soft.      Tenderness: There is no abdominal tenderness. There is no guarding.   Musculoskeletal:         General: No tenderness. Normal range of motion.   Skin:     General: Skin is warm and dry.      Capillary Refill: Capillary refill takes less than 2 seconds.      Coloration: Skin is pale.      Findings: Bruising present. No erythema.   Neurological:      Mental Status: He is alert and oriented to person, place, and time.      GCS: GCS eye subscore is 3. GCS verbal subscore is 4. GCS motor subscore is 6.      Sensory: No sensory deficit.      Motor: No abnormal muscle tone.      Coordination: Coordination normal.      Deep Tendon Reflexes: Reflexes normal.   Psychiatric:         Behavior: Behavior normal.       Medications  Current Facility-Administered Medications   Medication Dose Route Frequency Provider Last Rate Last Admin    insulin lispro (HumaLOG,AdmeLOG) subcutaneous injection  2-9 Units Subcutaneous 4X/DAY ANAMARIA Bates M.D.        And     dextrose 50% (D50W) injection 25 g  25 g Intravenous Q15 MIN PRN Gaby Bates M.D.        folic acid (Folvite) tablet 1 mg  1 mg Oral DAILY Aaron Phillips M.D.   1 mg at 01/25/25 0545    And    multivitamin tablet 1 Tablet  1 Tablet Oral DAILY Aaron Phillips M.D.   1 Tablet at 01/25/25 0545    thiamine (B-1) injection 200 mg  200 mg Intravenous DAILY Aaron Phillips M.D.   200 mg at 01/25/25 0544    gabapentin (Neurontin) capsule 300 mg  300 mg Oral TID Aaron Phillips M.D.   300 mg at 01/25/25 0545    dexmedetomidine (Precedex) 400 mcg/100mL infusion  0.1-1.5 mcg/kg/hr (Ideal) Intravenous Continuous Gaby Bates M.D.   Stopped at 01/23/25 1700    LORazepam (Ativan) injection 4 mg  4 mg Intravenous Q15 MIN PRN Gaby Bates M.D.        Or    LORazepam (Ativan) injection 3 mg  3 mg Intravenous Q15 MIN PRN Gaby Bates M.D.        Or    LORazepam (Ativan) injection 2 mg  2 mg Intravenous Q15 MIN PRN Gaby Bates M.D.   2 mg at 01/24/25 0602    Or    LORazepam (Ativan) injection 1 mg  1 mg Intravenous Q15 MIN PRN Gaby Bates M.D.        chlordiazePOXIDE (Librium) capsule 50 mg  50 mg Oral Q6HRS Aaron Phillips M.D.   50 mg at 01/25/25 0336    pantoprazole (Protonix) injection 40 mg  40 mg Intravenous BID Valeria Martinez D.O.   40 mg at 01/25/25 0544    Respiratory Therapy Consult   Nebulization Continuous RT Madeline Mccrary M.D.        enoxaparin (Lovenox) inj 40 mg  40 mg Subcutaneous DAILY AT 1800 Madeline Mccrary M.D.   40 mg at 01/24/25 1723    acetaminophen (Tylenol) tablet 650 mg  650 mg Oral Q6HRS PRN Madeline Mccrary M.D.        Pharmacy Consult Request ...Pain Management Review 1 Each  1 Each Other PHARMACY TO DOSE Madeline Mccrary M.D.        oxyCODONE immediate-release (Roxicodone) tablet 2.5 mg  2.5 mg Oral Q3HRS PRN Madeline Mccrary M.D.        Or    oxyCODONE immediate-release (Roxicodone) tablet 5 mg  5 mg Oral Q3HRS PRN Madeline Mccrary,  M.D.   5 mg at 01/24/25 1938    Or    morphine 4 MG/ML injection 2 mg  2 mg Intravenous Q3HRS PRN Madeline Mccrary M.D.        labetalol (Normodyne/Trandate) injection 10 mg  10 mg Intravenous Q4HRS PRN Madeline Mccrary M.D.        cloNIDine (Catapres) tablet 0.1 mg  0.1 mg Oral Q HOUR PRN Madeline Mccrary M.D.        ondansetron (Zofran) syringe/vial injection 4 mg  4 mg Intravenous Q4HRS PRN Madeline Mccrary M.D.   4 mg at 01/24/25 0559    ondansetron (Zofran ODT) dispertab 4 mg  4 mg Oral Q4HRS PRN Madeline Mccrary M.D.        promethazine (Phenergan) tablet 12.5-25 mg  12.5-25 mg Oral Q4HRS PRN Madeline Mccrary M.D.        promethazine (Phenergan) suppository 12.5-25 mg  12.5-25 mg Rectal Q4HRS PRN Madeline Mccrary M.D.        prochlorperazine (Compazine) injection 5-10 mg  5-10 mg Intravenous Q4HRS PRN Madeline Mccrary M.D.   10 mg at 01/22/25 0240    senna-docusate (Pericolace Or Senokot S) 8.6-50 MG per tablet 2 Tablet  2 Tablet Oral Q EVENING Madeline Mccrary M.D.   2 Tablet at 01/24/25 1723    And    polyethylene glycol/lytes (Miralax) Packet 1 Packet  1 Packet Oral QDAY PRN Madeline Mccrary M.D.        haloperidol lactate (Haldol) injection 5 mg  5 mg Intravenous Q4HRS PRN Madeline Mccrary M.D.         Fluids    Intake/Output Summary (Last 24 hours) at 1/25/2025 0752  Last data filed at 1/25/2025 0600  Gross per 24 hour   Intake 6461.02 ml   Output 5885 ml   Net 576.02 ml     Laboratory  Recent Labs     01/23/25  0536 01/24/25  0612   [id number]  --  2 liter   ISTATTEMP 97.2 F  --    ISTATSPEC Venous  --          Recent Labs     01/24/25  1405 01/24/25  1800 01/24/25  2215 01/25/25  0218 01/25/25  0600   SODIUM 140   < > 140 138 137   POTASSIUM 3.8   < > 4.2 4.1 3.5*   CHLORIDE 102   < > 103 102 100   CO2 25   < > 26 27 27   BUN 6*   < > 4* 3* 3*   CREATININE 0.85   < > 0.81 0.76 0.71   MAGNESIUM 1.4*  --  1.6  --  1.5   PHOSPHORUS 0.9*  --  3.6 2.3* 2.4*   CALCIUM 8.9   < > 8.7 8.6 9.2    < > = values in this interval not  displayed.     Recent Labs     01/23/25  0510 01/23/25  1008 01/24/25  2215 01/25/25  0218 01/25/25  0600   ALTSGPT 13  --   --  11  --    ASTSGOT 18  --   --  16  --    ALKPHOSPHAT 79  --   --  66  --    TBILIRUBIN 0.7  --   --  0.7  --    GLUCOSE 294*   < > 98 163* 103*    < > = values in this interval not displayed.     Recent Labs     01/23/25  0510 01/24/25  0200 01/24/25  1450 01/25/25  0218   WBC 18.4* 19.5* 11.5* 9.9   NEUTSPOLYS 86.00*  --   --  65.70   LYMPHOCYTES 4.00*  --   --  21.70*   MONOCYTES 10.00  --   --  11.70   EOSINOPHILS 0.00  --   --  0.20   BASOPHILS 0.00  --   --  0.20   ASTSGOT 18  --   --  16   ALTSGPT 13  --   --  11   ALKPHOSPHAT 79  --   --  66   TBILIRUBIN 0.7  --   --  0.7     Recent Labs     01/24/25  0200 01/24/25  1450 01/25/25  0218   RBC 5.20 4.60* 4.59*   HEMOGLOBIN 16.7 14.6 14.5   HEMATOCRIT 48.1 42.1 42.5   PLATELETCT 240 206 176     Imaging  X-Ray:  No film today    Assessment/Plan    * DKA, type 1, not at goal (HCC)- (present on admission)  Assessment & Plan  Incited by alcohol  1/24: gap closed but rapidly reopened, AG uptrending, BOH > 8, compensated metabolic acidosis on ABG  Persistent ketosis from euglycemic DKA/alcoholic ketosis  Lactic acid WNL  Resume insulin drip, DKA protocol  Maintain insulin gtt until AG closes and HCO3 >18 AND beta-hydroxybutyrate negative  Start thiamine IV  Start diabetic diet when DKA closes closely monitoring for refeeding syndrome  HgA1C 7.6%  AG closed overnight. On Lantus. Will resume his insulin pump this evening. Eating.     GI bleed  Assessment & Plan  Upper GI bleed attributed to grade 4 esophagitis likely related to alcoholism  Will need home PPI.   Encouraged to abstain from ETOH.       Hypophosphatemia- (present on admission)  Assessment & Plan  replete    Hypomagnesemia- (present on admission)  Assessment & Plan  replete    Marijuana user- (present on admission)  Assessment & Plan  Heavy marijuana use, risk for  CVS/withdrawal  montior       VTE:  Lovenox  Ulcer: PPI  Lines: None    I have performed a physical exam and reviewed and updated ROS and Plan today (1/25/2025). In review of yesterday's note (1/24/2025), there are no changes except as documented above.     Discussed patient condition and risk of morbidity and/or mortality with RN, RT, Pharmacy, Charge nurse / hot rounds, and Patient    Ready for discharge home today. I did encourage him and his mother for him to stay one more night, but they articulated that he had a plan for home and that they felt safe with him leaving today.     Total consult time: 50 minutes which included time spent on chart review, personally reviewing pertinent images and labs, time spent counseling and educating the patient and/or family members, and coordinating care with the healthcare team to include consultants.       Clifton Pulliam DO, [facility]  Staff Pulmonologist and Intensivist  [facility]     Please note that this dictation was created using voice recognition software. The accuracy of the dictation is limited to the abilities of the software. I have made every reasonable attempt to correct obvious errors, but I expect that there are errors of grammar and possibly content that I did not discover before finalizing the note.

## 2025-01-29 LAB
BACTERIA BLD CULT: NORMAL
BACTERIA BLD CULT: NORMAL
SIGNIFICANT IND 70042: NORMAL
SIGNIFICANT IND 70042: NORMAL
SITE SITE: NORMAL
SITE SITE: NORMAL
SOURCE SOURCE: NORMAL
SOURCE SOURCE: NORMAL

## (undated) DEVICE — SYRINGE SAFETY 10 ML 18 GA X 1 1/2 BLUNT LL (100/BX 4BX/CA)

## (undated) DEVICE — MASK WITH FACE SHIELD (25/BX 4BX/CA)

## (undated) DEVICE — TUBE E-T HI-LO CUFF 8.0MM (10EA/PK)

## (undated) DEVICE — TOWEL STOP TIMEOUT SAFETY FLAG (40EA/CA)

## (undated) DEVICE — SYRINGE DISP. 60 CC LL - (30/BX, 12BX/CA)**WHEN THESE ARE GONE ORDER #500206**

## (undated) DEVICE — CATHETER IV SAFETY 20 GA X 1-1/4 (50/BX)

## (undated) DEVICE — BITE BLOCK ADULT 60FR (100EA/CA)

## (undated) DEVICE — SYRINGE SAFETY 3 ML 18 GA X 1 1/2 BLUNT LL (100/BX 8BX/CA)

## (undated) DEVICE — TUBE E-T HI-LO CUFF 7.5MM (10EA/PK)

## (undated) DEVICE — TUBE SUCTION YANKAUER 1/4 X 6FT (50EA/CA)"

## (undated) DEVICE — LACTATED RINGERS INJ 1000 ML - (14EA/CA 60CA/PF)

## (undated) DEVICE — WATER IRRIGATION STERILE 1000ML (12EA/CA)

## (undated) DEVICE — TUBE E-T HI-LO CUFF 8.5MM (10EA/PK)

## (undated) DEVICE — FORCEP RADIAL JAW 4 STANDARD CAPACITY W/NEEDLE 240CM (40EA/BX)

## (undated) DEVICE — COVER LIGHT HANDLE FLEXIBLE - SOFT (2EA/PK 80PK/CA)

## (undated) DEVICE — KIT CUSTOM PROCEDURE SINGLE FOR ENDO (15/CA)

## (undated) DEVICE — GOWN SURGEONS LARGE - (32/CA)

## (undated) DEVICE — SYRINGE SAFETY 5 ML 18 GA X 1-1/2 BLUNT LL (100/BX 4BX/CA)

## (undated) DEVICE — TUBE E-T HI-LO CUFF 6.5MM (10EA/BX)

## (undated) DEVICE — KIT  I.V. START (100EA/CA)

## (undated) DEVICE — CANISTER SUCTION RIGID RED 1500CC (40EA/CA)

## (undated) DEVICE — SENSOR OXIMETER ADULT SPO2 RD SET (20EA/BX)

## (undated) DEVICE — TUBE CONNECTING SUCTION - CLEAR PLASTIC STERILE 72 IN (50EA/CA)

## (undated) DEVICE — TUBE E-T HI-LO CUFF 7.0MM (10EA/PK)